# Patient Record
Sex: MALE | Race: BLACK OR AFRICAN AMERICAN | URBAN - METROPOLITAN AREA
[De-identification: names, ages, dates, MRNs, and addresses within clinical notes are randomized per-mention and may not be internally consistent; named-entity substitution may affect disease eponyms.]

---

## 2019-07-30 LAB
ABS NEUT (OLG): 3.4 X10(3)/MCL (ref 1.5–6.9)
APPEARANCE, UA: CLEAR
BACTERIA SPEC CULT: NORMAL /HPF
BILIRUB UR QL STRIP: NEGATIVE
BUN SERPL-MCNC: 16 MG/DL (ref 10–20)
CALCIUM SERPL-MCNC: 8.6 MG/DL (ref 8–10.5)
CHLORIDE SERPL-SCNC: 106 MMOL/L (ref 100–108)
CO2 SERPL-SCNC: 28 MMOL/L (ref 21–35)
COLOR UR: ABNORMAL
CREAT SERPL-MCNC: 1.65 MG/DL (ref 0.7–1.3)
CREAT/UREA NIT SERPL: 10
ERYTHROCYTE [DISTWIDTH] IN BLOOD BY AUTOMATED COUNT: 12.9 % (ref 11.5–17)
GLUCOSE (UA): NEGATIVE
GLUCOSE SERPL-MCNC: 97 MG/DL (ref 75–116)
HCT VFR BLD AUTO: 41.2 % (ref 42–52)
HGB BLD-MCNC: 13.7 GM/DL (ref 14–18)
HGB UR QL STRIP: NEGATIVE
KETONES UR QL STRIP: NEGATIVE
LEUKOCYTE ESTERASE UR QL STRIP: NEGATIVE
MCH RBC QN AUTO: 29 PG (ref 27–34)
MCHC RBC AUTO-ENTMCNC: 33 GM/DL (ref 31–36)
MCV RBC AUTO: 87 FL (ref 80–99)
NITRITE UR QL STRIP: NEGATIVE
PH UR STRIP: 6 [PH]
PLATELET # BLD AUTO: 169 X10(3)/MCL (ref 140–400)
PMV BLD AUTO: 10.3 FL (ref 6.8–10)
POTASSIUM SERPL-SCNC: 3.7 MMOL/L (ref 3.6–5.2)
PROT UR QL STRIP: 30 MG/DL
RBC # BLD AUTO: 4.76 X10(6)/MCL (ref 4.7–6.1)
RBC #/AREA URNS HPF: NORMAL /HPF
SODIUM SERPL-SCNC: 141 MMOL/L (ref 135–145)
SP GR UR STRIP: 1.02
SQUAMOUS EPITHELIAL, UA: NORMAL /LPF
UROBILINOGEN UR STRIP-ACNC: 0.2 EU/DL
WBC # SPEC AUTO: 6.7 X10(3)/MCL (ref 4.5–11.5)
WBC #/AREA URNS HPF: NORMAL /HPF

## 2019-07-31 ENCOUNTER — HISTORICAL (OUTPATIENT)
Dept: ANESTHESIOLOGY | Facility: HOSPITAL | Age: 64
End: 2019-07-31

## 2022-04-30 NOTE — OP NOTE
PREOPERATIVE DIAGNOSIS:  Large osteophyte, medial femoral condyle of the right knee.    POSTOPERATIVE DIAGNOSIS:  Large osteophyte, medial femoral condyle of the right knee.    PROCEDURE:  Arthroscopic removal of medial femoral condylar osteophyte.  He also had grade 2-3 changes of the trochlea.    INDICATIONS:  The patient is a 63-year-old male with a painful large bony prominence of the medial femoral condyle.    DESCRIPTION OF PROCEDURE:  He was brought to the OR, given IV antibiotics and general anesthesia.  Prepped and draped.  He had a large palpable bone spur medially, rather large in size.  He was scoped.  He had no effusion.  Articular weightbearing surface was in good shape.  No meniscal pathology.  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  Osteophyte was visualized intra-articularly, so I took a bur and arthroscopically burred it down where it was no longer palpable.  The meniscus was intact.  The wound was irrigated.  Portals closed with nylon.  Sterile dressing applied.  No complications.        BARRY/PATRICA   DD: 07/31/2019 1258   DT: 07/31/2019 1309  Job # [id number]

## 2025-03-05 ENCOUNTER — LAB VISIT (OUTPATIENT)
Dept: LAB | Facility: HOSPITAL | Age: 70
End: 2025-03-05
Attending: INTERNAL MEDICINE
Payer: MEDICARE

## 2025-03-05 DIAGNOSIS — E11.9 DIABETES MELLITUS WITHOUT COMPLICATION: ICD-10-CM

## 2025-03-05 DIAGNOSIS — E78.00 PURE HYPERCHOLESTEROLEMIA: ICD-10-CM

## 2025-03-05 DIAGNOSIS — E55.9 VITAMIN D DEFICIENCY: Primary | ICD-10-CM

## 2025-03-05 DIAGNOSIS — E03.9 HYPOTHYROIDISM, UNSPECIFIED TYPE: ICD-10-CM

## 2025-03-05 DIAGNOSIS — I10 HYPERTENSION, UNSPECIFIED TYPE: ICD-10-CM

## 2025-03-05 LAB
25(OH)D3+25(OH)D2 SERPL-MCNC: 82 NG/ML (ref 30–80)
ALBUMIN SERPL-MCNC: 3.9 G/DL (ref 3.4–4.8)
ALBUMIN/GLOB SERPL: 1.1 RATIO (ref 1.1–2)
ALP SERPL-CCNC: 49 UNIT/L (ref 40–150)
ALT SERPL-CCNC: 25 UNIT/L (ref 0–55)
ANION GAP SERPL CALC-SCNC: 7 MEQ/L
AST SERPL-CCNC: 19 UNIT/L (ref 5–34)
BASOPHILS # BLD AUTO: 0.05 X10(3)/MCL
BASOPHILS NFR BLD AUTO: 0.7 %
BILIRUB SERPL-MCNC: 0.6 MG/DL
BUN SERPL-MCNC: 18 MG/DL (ref 8.4–25.7)
CALCIUM SERPL-MCNC: 9.1 MG/DL (ref 8.8–10)
CHLORIDE SERPL-SCNC: 109 MMOL/L (ref 98–107)
CHOLEST SERPL-MCNC: 129 MG/DL
CHOLEST/HDLC SERPL: 2 {RATIO} (ref 0–5)
CO2 SERPL-SCNC: 27 MMOL/L (ref 23–31)
CREAT SERPL-MCNC: 1.28 MG/DL (ref 0.72–1.25)
CREAT/UREA NIT SERPL: 14
EOSINOPHIL # BLD AUTO: 0.16 X10(3)/MCL (ref 0–0.9)
EOSINOPHIL NFR BLD AUTO: 2.4 %
ERYTHROCYTE [DISTWIDTH] IN BLOOD BY AUTOMATED COUNT: 12.8 % (ref 11.5–17)
EST. AVERAGE GLUCOSE BLD GHB EST-MCNC: 85.3 MG/DL
GFR SERPLBLD CREATININE-BSD FMLA CKD-EPI: >60 ML/MIN/1.73/M2
GLOBULIN SER-MCNC: 3.6 GM/DL (ref 2.4–3.5)
GLUCOSE SERPL-MCNC: 104 MG/DL (ref 82–115)
HBA1C MFR BLD: 4.6 %
HCT VFR BLD AUTO: 41.1 % (ref 42–52)
HDLC SERPL-MCNC: 57 MG/DL (ref 35–60)
HGB BLD-MCNC: 13.6 G/DL (ref 14–18)
IMM GRANULOCYTES # BLD AUTO: 0.03 X10(3)/MCL (ref 0–0.04)
IMM GRANULOCYTES NFR BLD AUTO: 0.4 %
LDLC SERPL CALC-MCNC: 56 MG/DL (ref 50–140)
LYMPHOCYTES # BLD AUTO: 1.93 X10(3)/MCL (ref 0.6–4.6)
LYMPHOCYTES NFR BLD AUTO: 28.8 %
MCH RBC QN AUTO: 28.9 PG (ref 27–31)
MCHC RBC AUTO-ENTMCNC: 33.1 G/DL (ref 33–36)
MCV RBC AUTO: 87.4 FL (ref 80–94)
MONOCYTES # BLD AUTO: 0.48 X10(3)/MCL (ref 0.1–1.3)
MONOCYTES NFR BLD AUTO: 7.2 %
NEUTROPHILS # BLD AUTO: 4.04 X10(3)/MCL (ref 2.1–9.2)
NEUTROPHILS NFR BLD AUTO: 60.5 %
NRBC BLD AUTO-RTO: 0 %
PLATELET # BLD AUTO: 163 X10(3)/MCL (ref 130–400)
PMV BLD AUTO: 10.5 FL (ref 7.4–10.4)
POTASSIUM SERPL-SCNC: 4 MMOL/L (ref 3.5–5.1)
PROT SERPL-MCNC: 7.5 GM/DL (ref 5.8–7.6)
RBC # BLD AUTO: 4.7 X10(6)/MCL (ref 4.7–6.1)
SODIUM SERPL-SCNC: 143 MMOL/L (ref 136–145)
TRIGL SERPL-MCNC: 80 MG/DL (ref 34–140)
TSH SERPL-ACNC: 1.27 UIU/ML (ref 0.35–4.94)
VLDLC SERPL CALC-MCNC: 16 MG/DL
WBC # BLD AUTO: 6.69 X10(3)/MCL (ref 4.5–11.5)

## 2025-03-05 PROCEDURE — 85025 COMPLETE CBC W/AUTO DIFF WBC: CPT

## 2025-03-05 PROCEDURE — 84443 ASSAY THYROID STIM HORMONE: CPT

## 2025-03-05 PROCEDURE — 80061 LIPID PANEL: CPT

## 2025-03-05 PROCEDURE — 82306 VITAMIN D 25 HYDROXY: CPT

## 2025-03-05 PROCEDURE — 83036 HEMOGLOBIN GLYCOSYLATED A1C: CPT

## 2025-03-05 PROCEDURE — 36415 COLL VENOUS BLD VENIPUNCTURE: CPT

## 2025-03-05 PROCEDURE — 80053 COMPREHEN METABOLIC PANEL: CPT

## 2025-07-10 ENCOUNTER — HOSPITAL ENCOUNTER (EMERGENCY)
Facility: HOSPITAL | Age: 70
Discharge: HOME | End: 2025-07-10
Attending: EMERGENCY MEDICINE
Payer: MEDICARE

## 2025-07-10 VITALS
WEIGHT: 202 LBS | HEIGHT: 67 IN | DIASTOLIC BLOOD PRESSURE: 85 MMHG | OXYGEN SATURATION: 96 % | SYSTOLIC BLOOD PRESSURE: 136 MMHG | BODY MASS INDEX: 31.71 KG/M2 | RESPIRATION RATE: 20 BRPM | TEMPERATURE: 99 F | HEART RATE: 101 BPM

## 2025-07-10 DIAGNOSIS — R07.9 CHEST PAIN: ICD-10-CM

## 2025-07-10 DIAGNOSIS — I50.9 CONGESTIVE HEART FAILURE, UNSPECIFIED HF CHRONICITY, UNSPECIFIED HEART FAILURE TYPE: Primary | ICD-10-CM

## 2025-07-10 LAB
ALBUMIN SERPL-MCNC: 3.9 G/DL (ref 3.4–4.8)
ALBUMIN/GLOB SERPL: 1 RATIO (ref 1.1–2)
ALP SERPL-CCNC: 65 UNIT/L (ref 40–150)
ALT SERPL-CCNC: 31 UNIT/L (ref 0–55)
ANION GAP SERPL CALC-SCNC: 10 MEQ/L
AST SERPL-CCNC: 27 UNIT/L (ref 11–45)
BASOPHILS # BLD AUTO: 0.1 X10(3)/MCL
BASOPHILS NFR BLD AUTO: 1.2 %
BILIRUB SERPL-MCNC: 0.9 MG/DL
BNP BLD-MCNC: 301.3 PG/ML
BUN SERPL-MCNC: 9 MG/DL (ref 8.4–25.7)
CALCIUM SERPL-MCNC: 9.4 MG/DL (ref 8.8–10)
CHLORIDE SERPL-SCNC: 109 MMOL/L (ref 98–107)
CO2 SERPL-SCNC: 23 MMOL/L (ref 23–31)
CREAT SERPL-MCNC: 1.12 MG/DL (ref 0.72–1.25)
CREAT/UREA NIT SERPL: 8
D DIMER PPP IA.FEU-MCNC: <0.27 UG/ML FEU (ref 0–0.5)
EOSINOPHIL # BLD AUTO: 0.13 X10(3)/MCL (ref 0–0.9)
EOSINOPHIL NFR BLD AUTO: 1.6 %
ERYTHROCYTE [DISTWIDTH] IN BLOOD BY AUTOMATED COUNT: 13.6 % (ref 11.5–17)
GFR SERPLBLD CREATININE-BSD FMLA CKD-EPI: >60 ML/MIN/1.73/M2
GLOBULIN SER-MCNC: 4.1 GM/DL (ref 2.4–3.5)
GLUCOSE SERPL-MCNC: 72 MG/DL (ref 82–115)
HCT VFR BLD AUTO: 42.2 % (ref 42–52)
HGB BLD-MCNC: 13.7 G/DL (ref 14–18)
IMM GRANULOCYTES # BLD AUTO: 0.04 X10(3)/MCL (ref 0–0.04)
IMM GRANULOCYTES NFR BLD AUTO: 0.5 %
LYMPHOCYTES # BLD AUTO: 1.98 X10(3)/MCL (ref 0.6–4.6)
LYMPHOCYTES NFR BLD AUTO: 24.3 %
MAGNESIUM SERPL-MCNC: 2.2 MG/DL (ref 1.6–2.6)
MCH RBC QN AUTO: 28.5 PG (ref 27–31)
MCHC RBC AUTO-ENTMCNC: 32.5 G/DL (ref 33–36)
MCV RBC AUTO: 87.7 FL (ref 80–94)
MONOCYTES # BLD AUTO: 0.49 X10(3)/MCL (ref 0.1–1.3)
MONOCYTES NFR BLD AUTO: 6 %
NEUTROPHILS # BLD AUTO: 5.4 X10(3)/MCL (ref 2.1–9.2)
NEUTROPHILS NFR BLD AUTO: 66.4 %
NRBC BLD AUTO-RTO: 0 %
PLATELET # BLD AUTO: 190 X10(3)/MCL (ref 130–400)
PMV BLD AUTO: 10.2 FL (ref 7.4–10.4)
POTASSIUM SERPL-SCNC: 3.6 MMOL/L (ref 3.5–5.1)
PROT SERPL-MCNC: 8 GM/DL (ref 5.8–7.6)
RBC # BLD AUTO: 4.81 X10(6)/MCL (ref 4.7–6.1)
SODIUM SERPL-SCNC: 142 MMOL/L (ref 136–145)
TROPONIN I SERPL-MCNC: 0.02 NG/ML (ref 0–0.04)
WBC # BLD AUTO: 8.14 X10(3)/MCL (ref 4.5–11.5)

## 2025-07-10 PROCEDURE — 93010 ELECTROCARDIOGRAM REPORT: CPT | Mod: ,,, | Performed by: INTERNAL MEDICINE

## 2025-07-10 PROCEDURE — 96374 THER/PROPH/DIAG INJ IV PUSH: CPT

## 2025-07-10 PROCEDURE — 93005 ELECTROCARDIOGRAM TRACING: CPT

## 2025-07-10 PROCEDURE — 84484 ASSAY OF TROPONIN QUANT: CPT | Performed by: EMERGENCY MEDICINE

## 2025-07-10 PROCEDURE — 99285 EMERGENCY DEPT VISIT HI MDM: CPT | Mod: 25

## 2025-07-10 PROCEDURE — 63600175 PHARM REV CODE 636 W HCPCS: Performed by: NURSE PRACTITIONER

## 2025-07-10 PROCEDURE — 85379 FIBRIN DEGRADATION QUANT: CPT | Performed by: NURSE PRACTITIONER

## 2025-07-10 PROCEDURE — 99900031 HC PATIENT EDUCATION (STAT)

## 2025-07-10 PROCEDURE — 83735 ASSAY OF MAGNESIUM: CPT | Performed by: EMERGENCY MEDICINE

## 2025-07-10 PROCEDURE — 94640 AIRWAY INHALATION TREATMENT: CPT

## 2025-07-10 PROCEDURE — 85025 COMPLETE CBC W/AUTO DIFF WBC: CPT | Performed by: EMERGENCY MEDICINE

## 2025-07-10 PROCEDURE — 83880 ASSAY OF NATRIURETIC PEPTIDE: CPT | Performed by: EMERGENCY MEDICINE

## 2025-07-10 PROCEDURE — 25000242 PHARM REV CODE 250 ALT 637 W/ HCPCS: Performed by: NURSE PRACTITIONER

## 2025-07-10 PROCEDURE — 94761 N-INVAS EAR/PLS OXIMETRY MLT: CPT

## 2025-07-10 PROCEDURE — 80053 COMPREHEN METABOLIC PANEL: CPT | Performed by: EMERGENCY MEDICINE

## 2025-07-10 RX ORDER — FUROSEMIDE 10 MG/ML
40 INJECTION INTRAMUSCULAR; INTRAVENOUS
Status: COMPLETED | OUTPATIENT
Start: 2025-07-10 | End: 2025-07-10

## 2025-07-10 RX ORDER — FUROSEMIDE 20 MG/1
20 TABLET ORAL DAILY
Qty: 5 TABLET | Refills: 0 | Status: SHIPPED | OUTPATIENT
Start: 2025-07-10 | End: 2025-07-15

## 2025-07-10 RX ORDER — IPRATROPIUM BROMIDE AND ALBUTEROL SULFATE 2.5; .5 MG/3ML; MG/3ML
3 SOLUTION RESPIRATORY (INHALATION)
Status: COMPLETED | OUTPATIENT
Start: 2025-07-10 | End: 2025-07-10

## 2025-07-10 RX ADMIN — FUROSEMIDE 40 MG: 10 INJECTION, SOLUTION INTRAVENOUS at 02:07

## 2025-07-10 RX ADMIN — IPRATROPIUM BROMIDE AND ALBUTEROL SULFATE 3 ML: .5; 3 SOLUTION RESPIRATORY (INHALATION) at 12:07

## 2025-07-10 NOTE — ED PROVIDER NOTES
Encounter Date: 7/10/2025       History     Chief Complaint   Patient presents with    Shortness of Breath     C/o SOB, CP, ringing in ears, right sided facial pain x 10 days. SOB is getting worse with exertion now. Pt was seen at the outpatient clinic and started on meds 10 days ago with no improvement     See MDM    The history is provided by the patient. No  was used.     Review of patient's allergies indicates:  No Known Allergies  Past Medical History:   Diagnosis Date    BPH (benign prostatic hyperplasia)     GERD (gastroesophageal reflux disease)     Hypertension     Mixed hyperlipidemia      History reviewed. No pertinent surgical history.  No family history on file.  Social History[1]  Review of Systems   Constitutional:  Negative for fever.   Respiratory:  Positive for shortness of breath. Negative for cough.    Cardiovascular:  Positive for chest pain.   Gastrointestinal:  Negative for abdominal pain.   Genitourinary:  Negative for difficulty urinating and dysuria.   Musculoskeletal:  Negative for gait problem.   Skin:  Negative for color change.   Neurological:  Negative for dizziness, speech difficulty and headaches.   Psychiatric/Behavioral:  Negative for hallucinations and suicidal ideas.    All other systems reviewed and are negative.      Physical Exam     Initial Vitals [07/10/25 1159]   BP Pulse Resp Temp SpO2   136/85 100 18 98.5 °F (36.9 °C) 96 %      MAP       --         Physical Exam    Nursing note and vitals reviewed.  Constitutional: He appears well-developed and well-nourished.   HENT:   Head: Normocephalic.   Tenderness to right jaw. Right ear TM normal. Left ear chronic TM rupture.    Eyes: EOM are normal.   Neck: Neck supple.   Normal range of motion.  Cardiovascular:  Normal rate, regular rhythm, normal heart sounds and intact distal pulses.           Mildly decreased breath sounds    Pulmonary/Chest: Breath sounds normal.   Abdominal: Abdomen is soft. Bowel sounds  are normal.   Musculoskeletal:         General: Normal range of motion.      Cervical back: Normal range of motion and neck supple.     Neurological: He is alert and oriented to person, place, and time. He has normal strength.   Skin: Skin is warm and dry. Capillary refill takes less than 2 seconds.   Psychiatric: He has a normal mood and affect. His behavior is normal. Judgment and thought content normal.         ED Course   Procedures  Labs Reviewed   B-TYPE NATRIURETIC PEPTIDE - Abnormal       Result Value    Natriuretic Peptide 301.3 (*)    COMPREHENSIVE METABOLIC PANEL - Abnormal    Sodium 142      Potassium 3.6      Chloride 109 (*)     CO2 23      Glucose 72 (*)     Blood Urea Nitrogen 9.0      Creatinine 1.12      Calcium 9.4      Protein Total 8.0 (*)     Albumin 3.9      Globulin 4.1 (*)     Albumin/Globulin Ratio 1.0 (*)     Bilirubin Total 0.9      ALP 65      ALT 31      AST 27      eGFR >60      Anion Gap 10.0      BUN/Creatinine Ratio 8     CBC WITH DIFFERENTIAL - Abnormal    WBC 8.14      RBC 4.81      Hgb 13.7 (*)     Hct 42.2      MCV 87.7      MCH 28.5      MCHC 32.5 (*)     RDW 13.6      Platelet 190      MPV 10.2      Neut % 66.4      Lymph % 24.3      Mono % 6.0      Eos % 1.6      Basophil % 1.2      Imm Grans % 0.5      Neut # 5.40      Lymph # 1.98      Mono # 0.49      Eos # 0.13      Baso # 0.10      Imm Gran # 0.04      NRBC% 0.0     MAGNESIUM - Normal    Magnesium Level 2.20     TROPONIN I - Normal    Troponin-I 0.023     D DIMER, QUANTITATIVE - Normal    D-Dimer <0.27     CBC W/ AUTO DIFFERENTIAL    Narrative:     The following orders were created for panel order CBC auto differential.  Procedure                               Abnormality         Status                     ---------                               -----------         ------                     CBC with Differential[[id number]]       Abnormal            Final result                 Please view results for these tests on the  individual orders.     EKG Readings: (Independently Interpreted)   Rhythm: Normal Sinus Rhythm. Heart Rate: 96. Ectopy: PVCs. Conduction: Normal. ST Segments: Normal ST Segments. T Waves: Normal. Axis: Normal. Other Findings: Prolonged QT Interval. Clinical Impression: Normal Sinus Rhythm     ECG Results              EKG 12-lead (In process)        Collection Time Result Time QRS Duration OHS QTC Calculation    07/10/25 12:00:03 07/10/25 13:04:47 88 502                     In process by Interface, Lab In [facility] (07/10/25 13:04:49)                   Narrative:    Test Reason : R07.9,    Vent. Rate :  96 BPM     Atrial Rate :  96 BPM     P-R Int : 194 ms          QRS Dur :  88 ms      QT Int : 398 ms       P-R-T Axes :  54  74  53 degrees    QTcB Int : 502 ms    Sinus rhythm with Premature atrial complexes with Aberrant conduction  Possible Left atrial enlargement  Nonspecific T wave abnormality  Prolonged QT  Abnormal ECG  No previous ECGs available    Referred By: AAAREFERRAL SELF           Confirmed By:                                   Imaging Results              X-Ray Chest AP Portable (Final result)  Result time 07/10/25 13:42:24      Final result by Felipe Kincaid MD (07/10/25 13:42:24)                   Impression:      1. Mild cardiomegaly  2. Increased vascular markings lower lungs  3. Thoracic spondylosis and mild scoliosis      Electronically signed by: Felipe Kincaid  Date:    07/10/2025  Time:    13:42               Narrative:    EXAMINATION:  XR CHEST AP PORTABLE    CLINICAL HISTORY:  chest pain;, .    COMPARISON:  07/13/2020    FINDINGS:  An AP view or more reveals heart to be mildly enlarged.  The trachea is midline.  No definite infiltrate or effusion is seen.  Mild increased vascular markings noted to lower lungs.  Degenerative changes and mild curvature are noted to the thoracic spine.                                       Medications   albuterol-ipratropium 2.5 mg-0.5 mg/3 mL nebulizer  "solution 3 mL (3 mLs Nebulization Given 7/10/25 1239)   furosemide injection 40 mg (40 mg Intravenous Given 7/10/25 1409)     Medical Decision Making  Historian:  Patient.  Patient is a Black or  69 y.o. male that presents with chest pain and shortness of breath that has been present 10 days. Associated symptoms were "popping" left ear and right facial pain. Surrounding information is nothing. Exacerbated by exertion. Relieved by nothing. Patient treatment prior to arrival went to urgent care and got an inhaler and medication. Risk factors include none. Other history pertaining to this complaint none.   Assessment:  See physical exam.  DD: ACS, CHF, Pneumonia, Bronchitis, Reactive Airway disease, PE, MI   ED Course: History was obtained.  Physical was performed.  Patient's BNP was elevated along with mild cardiomegaly. Lasix 40 mg given IVP. Duoneb was given and did not help symptoms. Will reassess SOB after diuresis.  D=dimer was checked and it was normal. Patient has right side facial pain. He was tender to with right mandible. No signs of abscess. His ears show no infection. Medical or surgical consults:  none. Social determinants that affect healthcare:  Health care illiteracy. Spent additional time on patient education pertaining to his current helath status, diagnosis, and prescribed medications.   Patient's breathing improved after lasix and diuresis.     Amount and/or Complexity of Data Reviewed  Labs: ordered.     Details: Labs unremarkable    Radiology: ordered.     Details: Mild cardiomegaly     Risk  Prescription drug management.  Risk Details: Lasix               ED Course as of 07/10/25 1442   Thu Jul 10, 2025   1206 Mildly decreased breath sounds. Duoneb ordered.  [CL]      ED Course User Index  [CL] Noe Nicholas, GEORGES                           Clinical Impression:  Final diagnoses:  [R07.9] Chest pain  [I50.9] Congestive heart failure, unspecified HF chronicity, unspecified heart " failure type (Primary)          ED Disposition Condition    Discharge Stable          ED Prescriptions       Medication Sig Dispense Start Date End Date Auth. Provider    furosemide (LASIX) 20 MG tablet Take 1 tablet (20 mg total) by mouth once daily. for 5 days 5 tablet 7/10/2025 7/15/2025 Noe Nicholas FNP          Follow-up Information       Follow up With Specialties Details Why Contact Info    Erich Borja MD Internal Medicine Call in 3 days ed follow up [address] [facility] [address]  [place] [address]  [phone]                     [1]   Social History  Tobacco Use    Smoking status: Never    Smokeless tobacco: Never   Vaping Use    Vaping status: Never Used   Substance Use Topics    Alcohol use: Not Currently    Drug use: Not Currently        Noe Nicholas FNP  07/10/25 3734

## 2025-07-11 LAB
OHS QRS DURATION: 88 MS
OHS QTC CALCULATION: 502 MS

## 2025-07-25 ENCOUNTER — HOSPITAL ENCOUNTER (INPATIENT)
Facility: HOSPITAL | Age: 70
LOS: 6 days | Discharge: HOME HEALTH | DRG: 314 | End: 2025-07-31
Attending: EMERGENCY MEDICINE | Admitting: INTERNAL MEDICINE
Payer: MEDICARE

## 2025-07-25 DIAGNOSIS — R07.89 ATYPICAL CHEST PAIN: Primary | ICD-10-CM

## 2025-07-25 DIAGNOSIS — R06.02 SOB (SHORTNESS OF BREATH): ICD-10-CM

## 2025-07-25 DIAGNOSIS — I50.9 ACUTE ON CHRONIC CONGESTIVE HEART FAILURE, UNSPECIFIED HEART FAILURE TYPE: ICD-10-CM

## 2025-07-25 DIAGNOSIS — I50.21 ACUTE SYSTOLIC HEART FAILURE: ICD-10-CM

## 2025-07-25 DIAGNOSIS — J18.9 PNEUMONIA: ICD-10-CM

## 2025-07-25 LAB
ALBUMIN SERPL-MCNC: 3.6 G/DL (ref 3.4–4.8)
ALBUMIN/GLOB SERPL: 0.9 RATIO (ref 1.1–2)
ALLENS TEST: ABNORMAL
ALP SERPL-CCNC: 55 UNIT/L (ref 40–150)
ALT SERPL-CCNC: 28 UNIT/L (ref 0–55)
ANION GAP SERPL CALC-SCNC: 8 MEQ/L
AST SERPL-CCNC: 23 UNIT/L (ref 11–45)
BASOPHILS # BLD AUTO: 0.02 X10(3)/MCL
BASOPHILS NFR BLD AUTO: 0.3 %
BILIRUB SERPL-MCNC: 0.9 MG/DL
BUN SERPL-MCNC: 11 MG/DL (ref 8.4–25.7)
CALCIUM SERPL-MCNC: 9.1 MG/DL (ref 8.8–10)
CHLORIDE SERPL-SCNC: 109 MMOL/L (ref 98–107)
CO2 SERPL-SCNC: 26 MMOL/L (ref 23–31)
CREAT SERPL-MCNC: 1.3 MG/DL (ref 0.72–1.25)
CREAT/UREA NIT SERPL: 8
DELSYS: ABNORMAL
EOSINOPHIL # BLD AUTO: 0.18 X10(3)/MCL (ref 0–0.9)
EOSINOPHIL NFR BLD AUTO: 2.4 %
ERYTHROCYTE [DISTWIDTH] IN BLOOD BY AUTOMATED COUNT: 13.5 % (ref 11.5–17)
GFR SERPLBLD CREATININE-BSD FMLA CKD-EPI: 60 ML/MIN/1.73/M2
GLOBULIN SER-MCNC: 3.8 GM/DL (ref 2.4–3.5)
GLUCOSE SERPL-MCNC: 115 MG/DL (ref 82–115)
HCO3 UR-SCNC: 21.5 MMOL/L (ref 24–28)
HCT VFR BLD AUTO: 40 % (ref 42–52)
HGB BLD-MCNC: 13.1 G/DL (ref 14–18)
IMM GRANULOCYTES # BLD AUTO: 0.03 X10(3)/MCL (ref 0–0.04)
IMM GRANULOCYTES NFR BLD AUTO: 0.4 %
LACTATE SERPL-SCNC: 1.6 MMOL/L (ref 0.5–2.2)
LIPASE SERPL-CCNC: 14 U/L
LYMPHOCYTES # BLD AUTO: 1.67 X10(3)/MCL (ref 0.6–4.6)
LYMPHOCYTES NFR BLD AUTO: 22.5 %
MCH RBC QN AUTO: 28.4 PG (ref 27–31)
MCHC RBC AUTO-ENTMCNC: 32.8 G/DL (ref 33–36)
MCV RBC AUTO: 86.8 FL (ref 80–94)
MONOCYTES # BLD AUTO: 0.45 X10(3)/MCL (ref 0.1–1.3)
MONOCYTES NFR BLD AUTO: 6.1 %
NEUTROPHILS # BLD AUTO: 5.06 X10(3)/MCL (ref 2.1–9.2)
NEUTROPHILS NFR BLD AUTO: 68.3 %
NT-PROBNP SERPL-MCNC: 901 PG/ML
OHS QRS DURATION: 88 MS
OHS QTC CALCULATION: 500 MS
PCO2 BLDA: 28.1 MMHG (ref 35–45)
PH SMN: 7.49 [PH] (ref 7.35–7.45)
PLATELET # BLD AUTO: 183 X10(3)/MCL (ref 130–400)
PMV BLD AUTO: 9.7 FL (ref 7.4–10.4)
PO2 BLDA: 64 MMHG (ref 80–100)
POC BE: -2 MMOL/L (ref -2–2)
POC SATURATED O2: 94 % (ref 95–100)
POC TCO2: 22 MMOL/L (ref 23–27)
POTASSIUM SERPL-SCNC: 3.5 MMOL/L (ref 3.5–5.1)
PROT SERPL-MCNC: 7.4 GM/DL (ref 5.8–7.6)
RBC # BLD AUTO: 4.61 X10(6)/MCL (ref 4.7–6.1)
SAMPLE: ABNORMAL
SITE: ABNORMAL
SODIUM SERPL-SCNC: 143 MMOL/L (ref 136–145)
TROPONIN I SERPL HS-MCNC: 22 NG/L
TROPONIN I SERPL HS-MCNC: 23 NG/L
WBC # BLD AUTO: 7.41 X10(3)/MCL (ref 4.5–11.5)

## 2025-07-25 PROCEDURE — 83605 ASSAY OF LACTIC ACID: CPT | Performed by: EMERGENCY MEDICINE

## 2025-07-25 PROCEDURE — 94640 AIRWAY INHALATION TREATMENT: CPT

## 2025-07-25 PROCEDURE — 80053 COMPREHEN METABOLIC PANEL: CPT | Performed by: EMERGENCY MEDICINE

## 2025-07-25 PROCEDURE — 99900035 HC TECH TIME PER 15 MIN (STAT)

## 2025-07-25 PROCEDURE — 93005 ELECTROCARDIOGRAM TRACING: CPT

## 2025-07-25 PROCEDURE — 84484 ASSAY OF TROPONIN QUANT: CPT | Performed by: EMERGENCY MEDICINE

## 2025-07-25 PROCEDURE — 96374 THER/PROPH/DIAG INJ IV PUSH: CPT

## 2025-07-25 PROCEDURE — 11000001 HC ACUTE MED/SURG PRIVATE ROOM

## 2025-07-25 PROCEDURE — 36600 WITHDRAWAL OF ARTERIAL BLOOD: CPT

## 2025-07-25 PROCEDURE — 25000242 PHARM REV CODE 250 ALT 637 W/ HCPCS: Performed by: INTERNAL MEDICINE

## 2025-07-25 PROCEDURE — 25000003 PHARM REV CODE 250: Performed by: INTERNAL MEDICINE

## 2025-07-25 PROCEDURE — 94761 N-INVAS EAR/PLS OXIMETRY MLT: CPT | Mod: XB

## 2025-07-25 PROCEDURE — 93010 ELECTROCARDIOGRAM REPORT: CPT | Mod: ,,, | Performed by: INTERNAL MEDICINE

## 2025-07-25 PROCEDURE — 99900031 HC PATIENT EDUCATION (STAT)

## 2025-07-25 PROCEDURE — 25500020 PHARM REV CODE 255: Performed by: EMERGENCY MEDICINE

## 2025-07-25 PROCEDURE — 63600175 PHARM REV CODE 636 W HCPCS: Performed by: INTERNAL MEDICINE

## 2025-07-25 PROCEDURE — 82803 BLOOD GASES ANY COMBINATION: CPT

## 2025-07-25 PROCEDURE — 25000003 PHARM REV CODE 250: Performed by: EMERGENCY MEDICINE

## 2025-07-25 PROCEDURE — 87040 BLOOD CULTURE FOR BACTERIA: CPT | Performed by: EMERGENCY MEDICINE

## 2025-07-25 PROCEDURE — 63600175 PHARM REV CODE 636 W HCPCS: Performed by: EMERGENCY MEDICINE

## 2025-07-25 PROCEDURE — 83880 ASSAY OF NATRIURETIC PEPTIDE: CPT | Performed by: EMERGENCY MEDICINE

## 2025-07-25 PROCEDURE — 83690 ASSAY OF LIPASE: CPT | Performed by: EMERGENCY MEDICINE

## 2025-07-25 PROCEDURE — 99285 EMERGENCY DEPT VISIT HI MDM: CPT | Mod: 25

## 2025-07-25 PROCEDURE — 85025 COMPLETE CBC W/AUTO DIFF WBC: CPT | Performed by: EMERGENCY MEDICINE

## 2025-07-25 PROCEDURE — 25000242 PHARM REV CODE 250 ALT 637 W/ HCPCS: Performed by: EMERGENCY MEDICINE

## 2025-07-25 RX ORDER — ATORVASTATIN CALCIUM 20 MG/1
20 TABLET, FILM COATED ORAL DAILY
COMMUNITY
Start: 2025-07-21

## 2025-07-25 RX ORDER — SODIUM CHLORIDE 0.9 % (FLUSH) 0.9 %
10 SYRINGE (ML) INJECTION
Status: DISCONTINUED | OUTPATIENT
Start: 2025-07-25 | End: 2025-07-31 | Stop reason: HOSPADM

## 2025-07-25 RX ORDER — FUROSEMIDE 10 MG/ML
20 INJECTION INTRAMUSCULAR; INTRAVENOUS
Status: COMPLETED | OUTPATIENT
Start: 2025-07-25 | End: 2025-07-25

## 2025-07-25 RX ORDER — BUDESONIDE 0.5 MG/2ML
0.5 INHALANT ORAL EVERY 12 HOURS
Status: DISCONTINUED | OUTPATIENT
Start: 2025-07-25 | End: 2025-07-30

## 2025-07-25 RX ORDER — TAMSULOSIN HYDROCHLORIDE 0.4 MG/1
0.4 CAPSULE ORAL NIGHTLY
Status: DISCONTINUED | OUTPATIENT
Start: 2025-07-25 | End: 2025-07-31 | Stop reason: HOSPADM

## 2025-07-25 RX ORDER — MUPIROCIN 20 MG/G
OINTMENT TOPICAL 2 TIMES DAILY
Status: DISPENSED | OUTPATIENT
Start: 2025-07-25 | End: 2025-07-30

## 2025-07-25 RX ORDER — IPRATROPIUM BROMIDE AND ALBUTEROL SULFATE 2.5; .5 MG/3ML; MG/3ML
3 SOLUTION RESPIRATORY (INHALATION)
Status: COMPLETED | OUTPATIENT
Start: 2025-07-25 | End: 2025-07-25

## 2025-07-25 RX ORDER — POTASSIUM CHLORIDE 20 MEQ/1
20 TABLET, EXTENDED RELEASE ORAL ONCE
Status: DISCONTINUED | OUTPATIENT
Start: 2025-07-25 | End: 2025-07-25

## 2025-07-25 RX ORDER — PANTOPRAZOLE SODIUM 40 MG/1
40 TABLET, DELAYED RELEASE ORAL DAILY
Status: DISCONTINUED | OUTPATIENT
Start: 2025-07-26 | End: 2025-07-31 | Stop reason: HOSPADM

## 2025-07-25 RX ORDER — IOPAMIDOL 755 MG/ML
100 INJECTION, SOLUTION INTRAVASCULAR
Status: COMPLETED | OUTPATIENT
Start: 2025-07-25 | End: 2025-07-25

## 2025-07-25 RX ORDER — ATORVASTATIN CALCIUM 20 MG/1
20 TABLET, FILM COATED ORAL DAILY
Status: DISCONTINUED | OUTPATIENT
Start: 2025-07-26 | End: 2025-07-31 | Stop reason: HOSPADM

## 2025-07-25 RX ORDER — IPRATROPIUM BROMIDE AND ALBUTEROL SULFATE 2.5; .5 MG/3ML; MG/3ML
3 SOLUTION RESPIRATORY (INHALATION) EVERY 6 HOURS
Status: DISCONTINUED | OUTPATIENT
Start: 2025-07-25 | End: 2025-07-26

## 2025-07-25 RX ORDER — ENOXAPARIN SODIUM 100 MG/ML
40 INJECTION SUBCUTANEOUS EVERY 24 HOURS
Status: DISCONTINUED | OUTPATIENT
Start: 2025-07-26 | End: 2025-07-31 | Stop reason: HOSPADM

## 2025-07-25 RX ORDER — POTASSIUM CHLORIDE 20 MEQ/1
20 TABLET, EXTENDED RELEASE ORAL ONCE
Status: COMPLETED | OUTPATIENT
Start: 2025-07-25 | End: 2025-07-25

## 2025-07-25 RX ORDER — FUROSEMIDE 10 MG/ML
20 INJECTION INTRAMUSCULAR; INTRAVENOUS ONCE
Status: COMPLETED | OUTPATIENT
Start: 2025-07-25 | End: 2025-07-25

## 2025-07-25 RX ORDER — VALSARTAN 160 MG/1
160 TABLET ORAL DAILY
Status: DISCONTINUED | OUTPATIENT
Start: 2025-07-26 | End: 2025-07-28

## 2025-07-25 RX ORDER — DILTIAZEM HYDROCHLORIDE 60 MG/1
2 TABLET, FILM COATED ORAL
COMMUNITY
Start: 2025-07-21

## 2025-07-25 RX ORDER — CARVEDILOL 3.12 MG/1
3.12 TABLET ORAL 2 TIMES DAILY
Status: DISCONTINUED | OUTPATIENT
Start: 2025-07-25 | End: 2025-07-29

## 2025-07-25 RX ORDER — CEFTRIAXONE 1 G/1
1 INJECTION, POWDER, FOR SOLUTION INTRAMUSCULAR; INTRAVENOUS
Status: COMPLETED | OUTPATIENT
Start: 2025-07-25 | End: 2025-07-25

## 2025-07-25 RX ORDER — TALC
6 POWDER (GRAM) TOPICAL NIGHTLY PRN
Status: DISCONTINUED | OUTPATIENT
Start: 2025-07-25 | End: 2025-07-31 | Stop reason: HOSPADM

## 2025-07-25 RX ORDER — FUROSEMIDE 20 MG/1
20 TABLET ORAL 2 TIMES DAILY
Status: DISCONTINUED | OUTPATIENT
Start: 2025-07-26 | End: 2025-07-29

## 2025-07-25 RX ORDER — FLUTICASONE FUROATE AND VILANTEROL 100; 25 UG/1; UG/1
1 POWDER RESPIRATORY (INHALATION) DAILY
Status: DISCONTINUED | OUTPATIENT
Start: 2025-07-26 | End: 2025-07-31 | Stop reason: HOSPADM

## 2025-07-25 RX ORDER — FINASTERIDE 5 MG/1
5 TABLET, FILM COATED ORAL DAILY
Status: DISCONTINUED | OUTPATIENT
Start: 2025-07-26 | End: 2025-07-31 | Stop reason: HOSPADM

## 2025-07-25 RX ORDER — AMLODIPINE AND VALSARTAN 10; 160 MG/1; MG/1
1 TABLET ORAL DAILY
Status: ON HOLD | COMMUNITY
Start: 2025-07-21 | End: 2025-07-30 | Stop reason: HOSPADM

## 2025-07-25 RX ORDER — FINASTERIDE 5 MG/1
5 TABLET, FILM COATED ORAL DAILY
COMMUNITY
Start: 2025-07-25

## 2025-07-25 RX ADMIN — IPRATROPIUM BROMIDE AND ALBUTEROL SULFATE 3 ML: 2.5; .5 SOLUTION RESPIRATORY (INHALATION) at 07:07

## 2025-07-25 RX ADMIN — BUDESONIDE INHALATION 0.5 MG: 0.5 SUSPENSION RESPIRATORY (INHALATION) at 07:07

## 2025-07-25 RX ADMIN — IOPAMIDOL 100 ML: 755 INJECTION, SOLUTION INTRAVENOUS at 12:07

## 2025-07-25 RX ADMIN — FUROSEMIDE 20 MG: 10 INJECTION, SOLUTION INTRAMUSCULAR; INTRAVENOUS at 09:07

## 2025-07-25 RX ADMIN — AZITHROMYCIN DIHYDRATE 500 MG: 500 INJECTION, POWDER, LYOPHILIZED, FOR SOLUTION INTRAVENOUS at 01:07

## 2025-07-25 RX ADMIN — POTASSIUM CHLORIDE 20 MEQ: 1500 TABLET, EXTENDED RELEASE ORAL at 09:07

## 2025-07-25 RX ADMIN — CEFTRIAXONE SODIUM 1 G: 1 INJECTION, POWDER, FOR SOLUTION INTRAMUSCULAR; INTRAVENOUS at 01:07

## 2025-07-25 RX ADMIN — FUROSEMIDE 20 MG: 10 INJECTION, SOLUTION INTRAMUSCULAR; INTRAVENOUS at 10:07

## 2025-07-25 RX ADMIN — CARVEDILOL 3.12 MG: 3.12 TABLET, FILM COATED ORAL at 09:07

## 2025-07-25 RX ADMIN — IPRATROPIUM BROMIDE AND ALBUTEROL SULFATE 3 ML: .5; 3 SOLUTION RESPIRATORY (INHALATION) at 10:07

## 2025-07-25 RX ADMIN — MUPIROCIN 1 G: 20 OINTMENT TOPICAL at 09:07

## 2025-07-25 RX ADMIN — TAMSULOSIN HYDROCHLORIDE 0.4 MG: 0.4 CAPSULE ORAL at 09:07

## 2025-07-25 NOTE — PLAN OF CARE
Problem: Hospitalized Older Adult  Goal: Optimal Cognitive Function  Outcome: Progressing  Goal: Effective Bowel Elimination  Outcome: Progressing  Goal: Optimal Coping  Outcome: Progressing  Goal: Fluid and Electrolyte Balance  Outcome: Progressing  Goal: Optimal Functional Ability  Outcome: Progressing  Goal: Improved Oral Intake  Outcome: Progressing  Goal: Adequate Sleep/Rest  Outcome: Progressing  Goal: Effective Urinary Elimination  Outcome: Progressing     Problem: Adult Inpatient Plan of Care  Goal: Plan of Care Review  Outcome: Progressing  Goal: Patient-Specific Goal (Individualized)  Outcome: Progressing  Goal: Absence of Hospital-Acquired Illness or Injury  Outcome: Progressing  Goal: Optimal Comfort and Wellbeing  Outcome: Progressing  Goal: Readiness for Transition of Care  Outcome: Progressing     Problem: Comorbidity Management  Goal: Blood Pressure in Desired Range  Outcome: Progressing     Problem: Gas Exchange Impaired  Goal: Optimal Gas Exchange  Outcome: Progressing

## 2025-07-25 NOTE — ED PROVIDER NOTES
Encounter Date: 7/25/2025       History     Chief Complaint   Patient presents with    Shortness of Breath    Chest Pain     C/o chest pains and SOB since he was seen here last week     HPI  61-year-old male history of HTN, HLD, GERD, BPH now with complaints of one-week history of increasing shortness of breath, productive cough of yellow sputum, and mid chest wall pain which worsens with coughing episodes.  Patient denies associated fever, chills, headache, neck pain, abdominal pain, increasing leg edema or calf pain.  Patient was seen here 2 weeks ago for similar complaints and diagnosed with mild CHF and discharged home with Lasix after he was given Lasix and DuoNeb here in ED..  Patient had also been seen in an urgent care prior to that and was prescribed an albuterol inhaler for reactive airway dz.   Review of patient's allergies indicates:  No Known Allergies  Past Medical History:   Diagnosis Date    BPH (benign prostatic hyperplasia)     GERD (gastroesophageal reflux disease)     Hypertension     Mixed hyperlipidemia      History reviewed. No pertinent surgical history.  No family history on file.  Social History[1]  Review of Systems   All other systems reviewed and are negative.      Physical Exam     Initial Vitals [07/25/25 0916]   BP Pulse Resp Temp SpO2   (!) 135/97 98 20 97.6 °F (36.4 °C) 97 %      MAP       --         Physical Exam    Nursing note and vitals reviewed.  Constitutional: He appears well-developed and well-nourished.   HENT:   Head: Normocephalic and atraumatic.   Nose: Nose normal. Mouth/Throat: Oropharynx is clear and moist.   Eyes: Conjunctivae and EOM are normal. Pupils are equal, round, and reactive to light.   Neck: Neck supple.   Normal range of motion.  Cardiovascular:  Normal rate, regular rhythm, normal heart sounds and intact distal pulses.           Pulmonary/Chest: He has wheezes.   Abdominal: Abdomen is soft. Bowel sounds are normal. There is no abdominal tenderness.    Musculoskeletal:         General: No tenderness or edema. Normal range of motion.      Cervical back: Normal range of motion and neck supple.     Neurological: He is alert and oriented to person, place, and time. He has normal strength. He displays normal reflexes. No cranial nerve deficit or sensory deficit. GCS score is 15. GCS eye subscore is 4. GCS verbal subscore is 5. GCS motor subscore is 6.   Skin: Skin is warm and dry. No rash noted.   Psychiatric: He has a normal mood and affect. His behavior is normal. Thought content normal.         ED Course   Procedures  Labs Reviewed   COMPREHENSIVE METABOLIC PANEL - Abnormal       Result Value    Sodium 143      Potassium 3.5      Chloride 109 (*)     CO2 26      Glucose 115      Blood Urea Nitrogen 11.0      Creatinine 1.30 (*)     Calcium 9.1      Protein Total 7.4      Albumin 3.6      Globulin 3.8 (*)     Albumin/Globulin Ratio 0.9 (*)     Bilirubin Total 0.9      ALP 55      ALT 28      AST 23      eGFR 60      Anion Gap 8.0      BUN/Creatinine Ratio 8     CBC WITH DIFFERENTIAL - Abnormal    WBC 7.41      RBC 4.61 (*)     Hgb 13.1 (*)     Hct 40.0 (*)     MCV 86.8      MCH 28.4      MCHC 32.8 (*)     RDW 13.5      Platelet 183      MPV 9.7      Neut % 68.3      Lymph % 22.5      Mono % 6.1      Eos % 2.4      Basophil % 0.3      Imm Grans % 0.4      Neut # 5.06      Lymph # 1.67      Mono # 0.45      Eos # 0.18      Baso # 0.02      Imm Gran # 0.03     NT-PRO NATRIURETIC PEPTIDE - Abnormal    NT-proBNP 901 (*)     Narrative:     NOTE:  Access complete set of age - and/or gender-specific reference intervals for this test in the [facility] Collection Manual.   ISTAT PROCEDURE - Abnormal    POC PH 7.493 (*)     POC PCO2 28.1 (*)     POC PO2 64 (*)     POC HCO3 21.5 (*)     POC BE -2      POC SATURATED O2 94      POC TCO2 22 (*)     Sample ARTERIAL      Site RR      Allens Test Pass      DelSys Room Air     TROPONIN I HIGH SENSITIVITY - Normal    Troponin  High Sensitive 23     LIPASE - Normal    Lipase Level 14     TROPONIN I HIGH SENSITIVITY - Normal    Troponin High Sensitive 22     LACTIC ACID, PLASMA - Normal    Lactic Acid Level 1.6     BLOOD CULTURE [facility]   BLOOD CULTURE [facility]   CBC W/ AUTO DIFFERENTIAL    Narrative:     The following orders were created for panel order CBC auto differential.  Procedure                               Abnormality         Status                     ---------                               -----------         ------                     CBC with Differential[[id number]]       Abnormal            Final result                 Please view results for these tests on the individual orders.     EKG Readings: (Independently Interpreted)   Normal sinus rhythm 100, nonspecific T-wave changes, no acute ST elevation or ST depression, normal axis.     ECG Results              EKG 12-lead (Final result)        Collection Time Result Time QRS Duration [facility] QTC Calculation    07/25/25 09:16:53 07/25/25 15:53:41 88 500                     Final result by Interface, Lab In [facility] (07/25/25 15:53:43)                   Narrative:    Test Reason : R07.9,    Vent. Rate : 100 BPM     Atrial Rate : 100 BPM     P-R Int : 198 ms          QRS Dur :  88 ms      QT Int : 388 ms       P-R-T Axes :  51  79  12 degrees    QTcB Int : 500 ms    Normal sinus rhythm  Nonspecific T wave abnormality  Abnormal ECG  When compared with ECG of 10-Jul-2025 12:00,  Aberrant conduction is no longer Present  Confirmed by Sheng Miles ([id number]) on 7/25/2025 3:53:38 PM    Referred By: AAAREFERRAL SELF           Confirmed By: Sheng Miles                                  Imaging Results              CTA Chest Non-Coronary (PE Studies) (Final result)  Result time 07/25/25 13:12:06      Final result by Felipe Kincaid MD (07/25/25 13:12:06)                   Impression:      1. No significant filling defects noted to the pulmonary arteries indicate pulmonary embolus  2.  Cardiomegaly  3. Small bilateral pleural effusion with associated patchy infiltrate and or atelectasis lung bases  4. Hazy ground-glass opacities throughout the lungs bilaterally which may represent small airway disease.  A atypical pneumonia cannot be excluded.  Clinical correlation is indicated  5. Findings and other details as above      Electronically signed by: Felipe Kincaid  Date:    07/25/2025  Time:    13:12               Narrative:    EXAMINATION:  CT ANGIOGRAM CHEST WITH IV CONTRAST:    CLINICAL HISTORY:  High clinical suspicion for pulmonary embolus    TECHNIQUE:  PATIENT RADIATION DOSE: DLP(mGycm) 305    As per PQRS measures, all CT scans at this facility used dose modulation, iterative reconstruction, and/or weight based dose adjustment when appropriate to reduce radiation dose to as low as reasonably achievable.    COMPARISON:  None available    FINDINGS:  Serial axial images were obtained of the chest with the administration of IV contrast. Additional coronal and sagittal mip reconstructions as well as 3-D rotational spin reconstructions were performed. NASCET criteria was utilized. Degenerative changes are noted to the thoracolumbar spine.  There is heterogeneity of the thyroid lobes bilaterally.  Atherosclerosis seen within the aorta and branching vessels.  Heart is enlarged.  Contrast identified within the heart and pulmonary vessels.  No significant filling defects are noted to the central pulmonary arteries and central artery divisions to indicate pulmonary embolus.  Small bilateral pleural effusions are identified.  A few lymph nodes measuring up to 1 cm are seen within the mediastinum.  No axillary lymphadenopathy is identified.  The major airways are grossly patent.  Mild hazy ground-glass opacities evident at the lungs bilaterally.  There is patchy infiltrate and or atelectasis posterior lung bases.                                       X-Ray Chest AP Portable (Final result)  Result time  07/25/25 11:24:43      Final result by Felipe Kincaid MD (07/25/25 11:24:43)                   Impression:      1. Mild cardiomegaly  2. Hazy interstitial opacities lower lungs bilaterally suggesting a chronic process.  A superimposed acute component cannot be entirely excluded.  Clinical correlation is indicated  3. Thoracic spondylosis      Electronically signed by: Felipe Kincaid  Date:    07/25/2025  Time:    11:24               Narrative:    EXAMINATION:  XR CHEST AP PORTABLE    CLINICAL HISTORY:  , Other chest pain.    COMPARISON:  07/10/2025    FINDINGS:  An AP view or more reveals the heart to be mildly enlarged.  The trachea is to the right of midline.  Mild hazy interstitial opacities evident lower lungs bilaterally.  No consolidative infiltrate or effusion is seen.  Degenerative changes are noted to the thoracic spine.                                       Medications   sodium chloride 0.9% flush 10 mL (has no administration in time range)   melatonin tablet 6 mg (has no administration in time range)   carvediloL tablet 3.125 mg (3.125 mg Oral Given 7/25/25 2132)   mupirocin 2 % ointment (1 g Nasal Given 7/25/25 2131)   atorvastatin tablet 20 mg (has no administration in time range)   finasteride tablet 5 mg (has no administration in time range)   fluticasone furoate-vilanteroL 100-25 mcg/dose diskus inhaler 1 puff (has no administration in time range)   tamsulosin 24 hr capsule 0.4 mg (0.4 mg Oral Given 7/25/25 2131)   valsartan tablet 160 mg (has no administration in time range)   albuterol-ipratropium 2.5 mg-0.5 mg/3 mL nebulizer solution 3 mL (3 mLs Nebulization Given 7/26/25 0055)   pantoprazole EC tablet 40 mg (has no administration in time range)   enoxaparin injection 40 mg (has no administration in time range)   furosemide tablet 20 mg (has no administration in time range)   budesonide nebulizer solution 0.5 mg (0.5 mg Nebulization Given 7/25/25 1918)   albuterol-ipratropium 2.5 mg-0.5 mg/3 mL  nebulizer solution 3 mL (3 mLs Nebulization Given 7/25/25 1008)   furosemide injection 20 mg (20 mg Intravenous Given 7/25/25 1041)   iopamidoL (ISOVUE-370) injection 100 mL (100 mLs Intravenous Given 7/25/25 1238)   cefTRIAXone injection 1 g (1 g Intravenous Given 7/25/25 1342)   azithromycin (ZITHROMAX) 500 mg in 0.9% NaCl 250 mL IVPB (admixture device) (0 mg Intravenous Stopped 7/25/25 1457)   furosemide injection 20 mg (20 mg Intravenous Given 7/25/25 2131)   potassium chloride SA CR tablet 20 mEq (20 mEq Oral Given 7/25/25 2131)     Medical Decision Making  Amount and/or Complexity of Data Reviewed  Labs: ordered.  Radiology: ordered.    Risk  OTC drugs.  Prescription drug management.  Decision regarding hospitalization.    A 61-year-old male history of hypertension now with complaints of one-week history of increasing shortness of breath, productive cough with chest discomfort with coughing only.  Vital signs were stable in ED, afebrile, O2 sat 99% on room air.  Patient did have some minimal wheezing on exam with no peripheral edema noted.  Abdomen is soft nontender.  Labs essentially WNL including a normal troponin x 2.  Chest x-ray pending.  EKG with no acute ST elevations or ST depressions, normal sinus rhythm rate of 100 with nonspecific T-wave changes.  Patient received DuoNeb here in ED with some improvement of his dyspnea.  ABG with a pH of 7.49, pCO2 28, PO2 64, bicarb 21.  CTA of chest with no evidence of PE but does show bilateral small airway disease and small bilateral pleural effusions.  After blood cultures patient was given Rocephin 1 g IV and Zithromax 500 mg IV and will be admitted to primary care physician for further medical management.                                     Clinical Impression:  Final diagnoses:  [R07.89] Atypical chest pain (Primary)  [J18.9] Pneumonia  [I50.9] Acute on chronic congestive heart failure, unspecified heart failure type          ED Disposition Condition    Admit                          Ryan Villegas MD  07/26/25 0617         [1]   Social History  Tobacco Use    Smoking status: Never    Smokeless tobacco: Never   Vaping Use    Vaping status: Never Used   Substance Use Topics    Alcohol use: Not Currently    Drug use: Not Currently        Ryan Villegas MD  07/26/25 0618

## 2025-07-26 PROBLEM — R10.9 ABDOMINAL PAIN: Status: ACTIVE | Noted: 2025-07-26

## 2025-07-26 LAB
ANION GAP SERPL CALC-SCNC: 8 MEQ/L
BASOPHILS # BLD AUTO: 0.09 X10(3)/MCL
BASOPHILS NFR BLD AUTO: 1.1 %
BUN SERPL-MCNC: 12 MG/DL (ref 8.4–25.7)
CALCIUM SERPL-MCNC: 8.9 MG/DL (ref 8.8–10)
CHLORIDE SERPL-SCNC: 111 MMOL/L (ref 98–107)
CO2 SERPL-SCNC: 25 MMOL/L (ref 23–31)
CREAT SERPL-MCNC: 1.28 MG/DL (ref 0.72–1.25)
CREAT/UREA NIT SERPL: 9
EOSINOPHIL # BLD AUTO: 0.23 X10(3)/MCL (ref 0–0.9)
EOSINOPHIL NFR BLD AUTO: 2.9 %
ERYTHROCYTE [DISTWIDTH] IN BLOOD BY AUTOMATED COUNT: 13.6 % (ref 11.5–17)
GFR SERPLBLD CREATININE-BSD FMLA CKD-EPI: >60 ML/MIN/1.73/M2
GLUCOSE SERPL-MCNC: 107 MG/DL (ref 82–115)
HCT VFR BLD AUTO: 38.2 % (ref 42–52)
HGB BLD-MCNC: 12.5 G/DL (ref 14–18)
IMM GRANULOCYTES # BLD AUTO: 0.04 X10(3)/MCL (ref 0–0.04)
IMM GRANULOCYTES NFR BLD AUTO: 0.5 %
LYMPHOCYTES # BLD AUTO: 1.86 X10(3)/MCL (ref 0.6–4.6)
LYMPHOCYTES NFR BLD AUTO: 23.4 %
MAGNESIUM SERPL-MCNC: 2.1 MG/DL (ref 1.6–2.6)
MCH RBC QN AUTO: 28.4 PG (ref 27–31)
MCHC RBC AUTO-ENTMCNC: 32.7 G/DL (ref 33–36)
MCV RBC AUTO: 86.8 FL (ref 80–94)
MONOCYTES # BLD AUTO: 0.59 X10(3)/MCL (ref 0.1–1.3)
MONOCYTES NFR BLD AUTO: 7.4 %
NEUTROPHILS # BLD AUTO: 5.13 X10(3)/MCL (ref 2.1–9.2)
NEUTROPHILS NFR BLD AUTO: 64.7 %
NRBC BLD AUTO-RTO: 0 %
PHOSPHATE SERPL-MCNC: 4.2 MG/DL (ref 2.3–4.7)
PLATELET # BLD AUTO: 183 X10(3)/MCL (ref 130–400)
PMV BLD AUTO: 10.5 FL (ref 7.4–10.4)
POTASSIUM SERPL-SCNC: 3.7 MMOL/L (ref 3.5–5.1)
PSA SERPL-MCNC: 2.69 NG/ML
RBC # BLD AUTO: 4.4 X10(6)/MCL (ref 4.7–6.1)
SODIUM SERPL-SCNC: 144 MMOL/L (ref 136–145)
WBC # BLD AUTO: 7.94 X10(3)/MCL (ref 4.5–11.5)

## 2025-07-26 PROCEDURE — 36415 COLL VENOUS BLD VENIPUNCTURE: CPT | Performed by: INTERNAL MEDICINE

## 2025-07-26 PROCEDURE — 84153 ASSAY OF PSA TOTAL: CPT | Performed by: INTERNAL MEDICINE

## 2025-07-26 PROCEDURE — 83735 ASSAY OF MAGNESIUM: CPT | Performed by: INTERNAL MEDICINE

## 2025-07-26 PROCEDURE — 25000003 PHARM REV CODE 250: Performed by: INTERNAL MEDICINE

## 2025-07-26 PROCEDURE — 25000242 PHARM REV CODE 250 ALT 637 W/ HCPCS: Performed by: INTERNAL MEDICINE

## 2025-07-26 PROCEDURE — 99900035 HC TECH TIME PER 15 MIN (STAT)

## 2025-07-26 PROCEDURE — 63600175 PHARM REV CODE 636 W HCPCS: Performed by: INTERNAL MEDICINE

## 2025-07-26 PROCEDURE — 94799 UNLISTED PULMONARY SVC/PX: CPT

## 2025-07-26 PROCEDURE — 21400001 HC TELEMETRY ROOM

## 2025-07-26 PROCEDURE — 25500020 PHARM REV CODE 255: Performed by: INTERNAL MEDICINE

## 2025-07-26 PROCEDURE — 85025 COMPLETE CBC W/AUTO DIFF WBC: CPT | Performed by: INTERNAL MEDICINE

## 2025-07-26 PROCEDURE — 94761 N-INVAS EAR/PLS OXIMETRY MLT: CPT

## 2025-07-26 PROCEDURE — 80048 BASIC METABOLIC PNL TOTAL CA: CPT | Performed by: INTERNAL MEDICINE

## 2025-07-26 PROCEDURE — 84100 ASSAY OF PHOSPHORUS: CPT | Performed by: INTERNAL MEDICINE

## 2025-07-26 PROCEDURE — 94640 AIRWAY INHALATION TREATMENT: CPT

## 2025-07-26 PROCEDURE — 11000001 HC ACUTE MED/SURG PRIVATE ROOM

## 2025-07-26 PROCEDURE — 27000221 HC OXYGEN, UP TO 24 HOURS

## 2025-07-26 RX ORDER — CEFTRIAXONE 1 G/1
1 INJECTION, POWDER, FOR SOLUTION INTRAMUSCULAR; INTRAVENOUS
Status: DISCONTINUED | OUTPATIENT
Start: 2025-07-26 | End: 2025-07-30

## 2025-07-26 RX ORDER — LEVALBUTEROL INHALATION SOLUTION 1.25 MG/3ML
1.25 SOLUTION RESPIRATORY (INHALATION) EVERY 8 HOURS
Status: DISCONTINUED | OUTPATIENT
Start: 2025-07-27 | End: 2025-07-30

## 2025-07-26 RX ORDER — DIATRIZOATE MEGLUMINE AND DIATRIZOATE SODIUM 660; 100 MG/ML; MG/ML
60 SOLUTION ORAL; RECTAL
Status: COMPLETED | OUTPATIENT
Start: 2025-07-26 | End: 2025-07-26

## 2025-07-26 RX ADMIN — IPRATROPIUM BROMIDE AND ALBUTEROL SULFATE 3 ML: 2.5; .5 SOLUTION RESPIRATORY (INHALATION) at 07:07

## 2025-07-26 RX ADMIN — IPRATROPIUM BROMIDE AND ALBUTEROL SULFATE 3 ML: 2.5; .5 SOLUTION RESPIRATORY (INHALATION) at 12:07

## 2025-07-26 RX ADMIN — CEFTRIAXONE SODIUM 1 G: 1 INJECTION, POWDER, FOR SOLUTION INTRAMUSCULAR; INTRAVENOUS at 10:07

## 2025-07-26 RX ADMIN — MUPIROCIN 1 G: 20 OINTMENT TOPICAL at 09:07

## 2025-07-26 RX ADMIN — DIATRIZOATE MEGLUMINE AND DIATRIZOATE SODIUM 60 ML: 660; 100 LIQUID ORAL; RECTAL at 01:07

## 2025-07-26 RX ADMIN — FUROSEMIDE 20 MG: 20 TABLET ORAL at 05:07

## 2025-07-26 RX ADMIN — FUROSEMIDE 20 MG: 20 TABLET ORAL at 08:07

## 2025-07-26 RX ADMIN — BUDESONIDE INHALATION 0.5 MG: 0.5 SUSPENSION RESPIRATORY (INHALATION) at 07:07

## 2025-07-26 RX ADMIN — TAMSULOSIN HYDROCHLORIDE 0.4 MG: 0.4 CAPSULE ORAL at 09:07

## 2025-07-26 RX ADMIN — CARVEDILOL 3.12 MG: 3.12 TABLET, FILM COATED ORAL at 09:07

## 2025-07-26 RX ADMIN — PANTOPRAZOLE SODIUM 40 MG: 40 TABLET, DELAYED RELEASE ORAL at 08:07

## 2025-07-26 RX ADMIN — ATORVASTATIN CALCIUM 20 MG: 20 TABLET, FILM COATED ORAL at 08:07

## 2025-07-26 RX ADMIN — MUPIROCIN 1 G: 20 OINTMENT TOPICAL at 08:07

## 2025-07-26 RX ADMIN — CARVEDILOL 3.12 MG: 3.12 TABLET, FILM COATED ORAL at 08:07

## 2025-07-26 RX ADMIN — FINASTERIDE 5 MG: 5 TABLET, FILM COATED ORAL at 08:07

## 2025-07-26 RX ADMIN — VALSARTAN 160 MG: 160 TABLET, FILM COATED ORAL at 08:07

## 2025-07-26 RX ADMIN — ENOXAPARIN SODIUM 40 MG: 40 INJECTION SUBCUTANEOUS at 05:07

## 2025-07-26 RX ADMIN — AZITHROMYCIN MONOHYDRATE 500 MG: 500 INJECTION, POWDER, LYOPHILIZED, FOR SOLUTION INTRAVENOUS at 10:07

## 2025-07-26 NOTE — PROGRESS NOTES
Progress Note    Admit Date: 7/25/2025    DATE OF SERVICE 07/26         SUBJECTIVE:     Follow-up For:  Shortness of breath.  CT was negative for PE.  Today he is less short of breath than when he came in.  However upon exam he does have tenderness to his abdomen.  Mostly in the right upper quadrant.    Scheduled Meds:  He has 2 visitors, his neighbors, in the room with him today.   albuterol-ipratropium  3 mL Nebulization Q6H    atorvastatin  20 mg Oral Daily    azithromycin  500 mg Intravenous Q24H    budesonide  0.5 mg Nebulization Q12H    carvediloL  3.125 mg Oral BID    cefTRIAXone (Rocephin) IV (PEDS and ADULTS)  1 g Intravenous Q24H    enoxparin  40 mg Subcutaneous Q24H (prophylaxis, 1700)    finasteride  5 mg Oral Daily    fluticasone furoate-vilanteroL  1 puff Inhalation Daily    furosemide  20 mg Oral BID    mupirocin   Nasal BID    pantoprazole  40 mg Oral Daily    tamsulosin  0.4 mg Oral QHS    valsartan  160 mg Oral Daily     Continuous Infusions:  PRN Meds:  Current Facility-Administered Medications:     melatonin, 6 mg, Oral, Nightly PRN    sodium chloride 0.9%, 10 mL, Intravenous, PRN    Review of patient's allergies indicates:  No Known Allergies    Review of Systems  ROS    OBJECTIVE:     Vital Signs (Most Recent)  Temp: 98.7 °F (37.1 °C) (07/26/25 0831)  Pulse: 108 (07/26/25 0800)  Resp: (!) 25 (07/26/25 0800)  BP: 132/86 (07/26/25 0800)  SpO2: (!) 93 % (07/26/25 0800)    Vital Signs Range (Last 24H):  Temp:  [97.5 °F (36.4 °C)-98.7 °F (37.1 °C)]   Pulse:  []   Resp:  [12-43]   BP: (115-141)/(75-93)   SpO2:  [86 %-99 %]     I & O (Last 24H):  Intake/Output Summary (Last 24 hours) at 7/26/2025 1057  Last data filed at 7/26/2025 0614  Gross per 24 hour   Intake 550.2 ml   Output 3175 ml   Net -2624.8 ml     Physical Exam:  Physical Exam   Vitals:    07/26/25 0831   BP:    Pulse:    Resp:    Temp: 98.7 °F (37.1 °C)       Physical Exam   Constitutional: alert & oriented, no acute distress  HENT:    Head: Normocephalic and atraumatic.   Eyes: Conjunctivae normal and EOM are normal. Pupils are equal, round, and reactive to light.   Neck: Normal range of motion. Neck supple.   Cardiovascular: Normal rate, regular rhythm, normal heart sounds   Pulmonary/Chest: CTAB, diffuse rhonchi.  nonlabored respiration  Abdominal: Soft, tender diffusely however more tender in the right upper quadrant.  Also complained of some shoulder pain.  bowel sounds normal  Neurological: nonfocal  Skin: warm, dry intact  Psychiatric: normal mood and affect, cooperative        Laboratory:  Recent Results (from the past 24 hours)   Troponin I High Sensitivity    Collection Time: 07/25/25 11:29 AM   Result Value Ref Range    Troponin High Sensitive 22 <=35 ng/L   ISTAT PROCEDURE    Collection Time: 07/25/25 12:51 PM   Result Value Ref Range    POC PH 7.493 (H) 7.35 - 7.45    POC PCO2 28.1 (LL) 35 - 45 mmHg    POC PO2 64 (L) 80 - 100 mmHg    POC HCO3 21.5 (L) 24 - 28 mmol/L    POC BE -2 -2 to 2 mmol/L    POC SATURATED O2 94 95 - 100 %    POC TCO2 22 (L) 23 - 27 mmol/L    Sample ARTERIAL     Site RR     Allens Test Pass     DelSys Room Air    Lactic acid, plasma    Collection Time: 07/25/25  1:42 PM   Result Value Ref Range    Lactic Acid Level 1.6 0.5 - 2.2 mmol/L   Basic Metabolic Panel    Collection Time: 07/26/25  3:40 AM   Result Value Ref Range    Sodium 144 136 - 145 mmol/L    Potassium 3.7 3.5 - 5.1 mmol/L    Chloride 111 (H) 98 - 107 mmol/L    CO2 25 23 - 31 mmol/L    Glucose 107 82 - 115 mg/dL    Blood Urea Nitrogen 12.0 8.4 - 25.7 mg/dL    Creatinine 1.28 (H) 0.72 - 1.25 mg/dL    BUN/Creatinine Ratio 9     Calcium 8.9 8.8 - 10.0 mg/dL    Anion Gap 8.0 mEq/L    eGFR >60 mL/min/1.73/m2   Magnesium    Collection Time: 07/26/25  3:40 AM   Result Value Ref Range    Magnesium Level 2.10 1.60 - 2.60 mg/dL   Phosphorus    Collection Time: 07/26/25  3:40 AM   Result Value Ref Range    Phosphorus Level 4.2 2.3 - 4.7 mg/dL   PSA, Total  (Diagnostic)    Collection Time: 07/26/25  3:40 AM   Result Value Ref Range    Prostate Specific Antigen 2.69 <=4.00 ng/mL   CBC with Differential    Collection Time: 07/26/25  3:40 AM   Result Value Ref Range    WBC 7.94 4.50 - 11.50 x10(3)/mcL    RBC 4.40 (L) 4.70 - 6.10 x10(6)/mcL    Hgb 12.5 (L) 14.0 - 18.0 g/dL    Hct 38.2 (L) 42.0 - 52.0 %    MCV 86.8 80.0 - 94.0 fL    MCH 28.4 27.0 - 31.0 pg    MCHC 32.7 (L) 33.0 - 36.0 g/dL    RDW 13.6 11.5 - 17.0 %    Platelet 183 130 - 400 x10(3)/mcL    MPV 10.5 (H) 7.4 - 10.4 fL    Neut % 64.7 %    Lymph % 23.4 %    Mono % 7.4 %    Eos % 2.9 %    Basophil % 1.1 %    Imm Grans % 0.5 %    Neut # 5.13 2.1 - 9.2 x10(3)/mcL    Lymph # 1.86 0.6 - 4.6 x10(3)/mcL    Mono # 0.59 0.1 - 1.3 x10(3)/mcL    Eos # 0.23 0 - 0.9 x10(3)/mcL    Baso # 0.09 <=0.2 x10(3)/mcL    Imm Gran # 0.04 0.00 - 0.04 x10(3)/mcL    NRBC% 0.0 %        Diagnostic Results:  X-ray Shoulder 2 or More Views Right  Result Date: 7/26/2025  EXAMINATION: XR SHOULDER COMPLETE 2 OR MORE VIEWS RIGHT CLINICAL HISTORY: shoulder pain; TECHNIQUE: Two or three views of the right shoulder were performed. COMPARISON: None FINDINGS: No fracture dislocation.  Joint space narrowing is identified of the acromioclavicular joint as well as of the glenohumeral joint with spurring.  Subacromial space is maintained. The soft tissues are grossly normal.  No joint effusion. The visualized lungs are clear the visualized ribs are intact.     No fracture dislocation right shoulder.  Degenerative changes are noted. Electronically signed by: Ermias Tovar MD Date:    07/26/2025 Time:    09:33    CTA Chest Non-Coronary (PE Studies)  Result Date: 7/25/2025  EXAMINATION: CT ANGIOGRAM CHEST WITH IV CONTRAST: CLINICAL HISTORY: High clinical suspicion for pulmonary embolus TECHNIQUE: PATIENT RADIATION DOSE: DLP(mGycm) 305 As per PQRS measures, all CT scans at this facility used dose modulation, iterative reconstruction, and/or weight based dose  adjustment when appropriate to reduce radiation dose to as low as reasonably achievable. COMPARISON: None available FINDINGS: Serial axial images were obtained of the chest with the administration of IV contrast. Additional coronal and sagittal mip reconstructions as well as 3-D rotational spin reconstructions were performed. NASCET criteria was utilized. Degenerative changes are noted to the thoracolumbar spine.  There is heterogeneity of the thyroid lobes bilaterally.  Atherosclerosis seen within the aorta and branching vessels.  Heart is enlarged.  Contrast identified within the heart and pulmonary vessels.  No significant filling defects are noted to the central pulmonary arteries and central artery divisions to indicate pulmonary embolus.  Small bilateral pleural effusions are identified.  A few lymph nodes measuring up to 1 cm are seen within the mediastinum.  No axillary lymphadenopathy is identified.  The major airways are grossly patent.  Mild hazy ground-glass opacities evident at the lungs bilaterally.  There is patchy infiltrate and or atelectasis posterior lung bases.     1. No significant filling defects noted to the pulmonary arteries indicate pulmonary embolus 2. Cardiomegaly 3. Small bilateral pleural effusion with associated patchy infiltrate and or atelectasis lung bases 4. Hazy ground-glass opacities throughout the lungs bilaterally which may represent small airway disease.  A atypical pneumonia cannot be excluded.  Clinical correlation is indicated 5. Findings and other details as above Electronically signed by: Felipe Kincaid Date:    07/25/2025 Time:    13:12    X-Ray Chest AP Portable  Result Date: 7/25/2025  EXAMINATION: XR CHEST AP PORTABLE CLINICAL HISTORY: , Other chest pain. COMPARISON: 07/10/2025 FINDINGS: An AP view or more reveals the heart to be mildly enlarged.  The trachea is to the right of midline.  Mild hazy interstitial opacities evident lower lungs bilaterally.  No  consolidative infiltrate or effusion is seen.  Degenerative changes are noted to the thoracic spine.     1. Mild cardiomegaly 2. Hazy interstitial opacities lower lungs bilaterally suggesting a chronic process.  A superimposed acute component cannot be entirely excluded.  Clinical correlation is indicated 3. Thoracic spondylosis Electronically signed by: Felipe Kincaid Date:    07/25/2025 Time:    11:24       ASSESSMENT/PLAN:       Plan:   Patient Active Problem List    Diagnosis Date Noted    Acute on chronic congestive heart failure 07/25/2025    Pneumonia 07/25/2025    Atypical chest pain 07/25/2025      Shortness of breath multifactorial, a pneumonic process may be at play versus congestive heart failure.  Echo pending.  Patient on Zithromax and ceftriaxone to cover him for pneumonic process.      He is on pantoprazole for any GI process that may be going on such as GERD that might be causing him to have this cough and also in the referred pain.    Blood pressure is at goal.  GERD being treated with pantoprazole   History of BPH with hypoplasia.  With LUTS.  On Flomax.  History of reactive airway.  On Symbicort.  Tachycardia pulse rate lowering to 108.  We will monitor may need to increase his Coreg.  Abdominal pain right upper quadrant.  Unable to get an ultrasound we will do a CT.

## 2025-07-26 NOTE — PLAN OF CARE
Problem: Hospitalized Older Adult  Goal: Optimal Cognitive Function  Outcome: Progressing  Goal: Effective Bowel Elimination  Outcome: Progressing  Goal: Optimal Coping  Outcome: Progressing  Goal: Fluid and Electrolyte Balance  Outcome: Progressing  Goal: Optimal Functional Ability  Outcome: Progressing  Goal: Improved Oral Intake  Outcome: Progressing  Goal: Adequate Sleep/Rest  Outcome: Progressing  Goal: Effective Urinary Elimination  Outcome: Progressing     Problem: Adult Inpatient Plan of Care  Goal: Plan of Care Review  Outcome: Progressing  Goal: Patient-Specific Goal (Individualized)  Outcome: Progressing  Goal: Absence of Hospital-Acquired Illness or Injury  Outcome: Progressing  Goal: Optimal Comfort and Wellbeing  Outcome: Progressing  Goal: Readiness for Transition of Care  Outcome: Progressing     Problem: Comorbidity Management  Goal: Blood Pressure in Desired Range  Outcome: Progressing     Problem: Gas Exchange Impaired  Goal: Optimal Gas Exchange  Outcome: Progressing     Problem: Pneumonia  Goal: Fluid Balance  Outcome: Progressing  Goal: Resolution of Infection Signs and Symptoms  Outcome: Progressing  Goal: Effective Oxygenation and Ventilation  Outcome: Progressing

## 2025-07-26 NOTE — H&P
[facility]                     [address] [address]    PATIENT NAME:       RUBEN HUTCHINSON  YOB: 1955  CSN:                [id number]   MRN:                [id number]  ADMIT DATE:         07/25/2025 09:19:00  PHYSICIAN:          Erich Borja MD                        HISTORY AND PHYSICAL      CHIEF COMPLAINT:  The patient came to the emergency room complaining of   shortness of breath.    HISTORY OF PRESENT ILLNESS:  Mr. Ruben Hutchinson is a very pleasant 69-year-old   gentleman, resident of [place], who has a few medical problems, had come to the   emergency room on 07/10/2025 complaining of tinnitus in the right ear and   shortness of breath on exertion.  He was found to have early signs of pulmonary   congestion.  BNP was slightly elevated at 301.  He was given a prescription of   Lasix and was sent back to continue using his Symbicort, but patient again came   today with the same complaint, that his shortness of breath was getting worse.    The ER physician worked on the patient and at this time, we also did an ABG,   which showed that the patient had a pH of 7.49, pCO2 of 28, but he was hypoxic   at 64 with saturation at 94% on room air.  He also was tachycardic.  Troponin   was negative at 0.03.  His chest x-ray shows a hazy interstitial opacities in   lower lungs bilaterally suggesting a chronic process.  A superimposed component   cannot be excluded.  We did a CAT scan from the PE protocol because of the   tachycardia with shortness of breath.  He had no pulmonary embolus, but he had   bilateral pleural effusion with associated patchy infiltrate and/or atelectasis   and again hazy ground-glass opacities throughout the lungs bilaterally, which   may represent small airway disease.  Atypical pneumonia cannot be excluded.    Clinical correlation is indicated.  The patient was admitted for shortness of    breath of multifactorial origin.  His BNP was found to be, in spite of being on   Lasix, at 901.  So, he had a multifactorial shortness of breath from pneumonic   process versus small airway disease versus early evidence of congestive heart   failure, ejection fraction unknown, echocardiogram has been added and compounded   with his obesity.  Besides that, patient did not have any other systems   specific complaint.    PAST MEDICAL HISTORY:  Significant for GERD, BPH with LUTS, hypertension,   dyslipidemia.    PAST SURGICAL HISTORY:  Pertinent only for circumcision.    SOCIAL HISTORY:  The patient is a resident of [place].  He lives in an apartment.    He has a friend who helps with daily needs.    FAMILY HISTORY:  Pertinent that he does not know much about his mother and   father.  He was raised in a foster home.  He was in [place], [place].  He has 3   sisters and 1 brother.  He does not know much about it because he got    long time ago.    MEDICATIONS:  On which the patient is on are as follows;  1. Finasteride 5 mg daily.  2. Tamsulosin 0.4 mg daily.  3. Atorvastatin 20 mg daily.  4. Symbicort 160/4.5 two puffs b.i.d.  5. Omeprazole 40 mg daily.  6. Vitamin D 50,000 units q.week.  7. Amlodipine and valsartan 10/160 one tablet daily.    REVIEW OF SYSTEMS:  As mentioned in history of present illness.    PHYSICAL EXAMINATION:  GENERAL:  The patient is alert and oriented x3.  He is sitting up in the bed,   still having some shortness of breath.  He feels better sitting upright.  VITAL SIGNS: Blood pressure 122/91, temperature 97.5, O2 sat 91% on room air, I   put him on 2 L.  Pulse is 110.  HEENT:  Normocephalic.  Pupils bilaterally reactive, equal in size.  Mild   conjunctival pallor.  No scleral icterus.  Trachea is midline.  CHEST:  Has bilateral diffuse rhonchi and few crepitations.  CVS:  First and second heart sounds are heard normally without any murmurs.   ABDOMEN:  Soft, nontender, and obese.    EXTREMITIES:  Devoid of edema, cyanosis, or clubbing.    LABS AND INVESTIGATIONS:  Sodium 143, potassium 3.5, chloride 109, bicarb 26,   BUN 11, creatinine 1.30, GFR of 60, glucose 115, calcium 9.1.  Lactic acid 1.6.    .  WBC 7.4, hemoglobin 13.1, hematocrit 40.0, MCV 86.8, platelet count   adequate.  PT/INR within normal limits.  D-dimer less than 0.27.  ABG on room   air; pH 7.4, pCO2 28, PO2 64, saturation 94%.  Chest x-ray; mild cardiomegaly,   hazy interstitial opacities in lower lungs bilaterally suggesting a chronic   process.  A superimposed acute process cannot be ruled out.  CAT scan of the   chest confirms the findings of chest x-ray, no pulmonary embolus.  EKG; sinus   rhythm, nonspecific ST-T changes.    IMPRESSION:    1. Shortness of breath, multifactorial, pneumonic process versus small airway   disease versus congestive heart failure secondary to most likely diastolic   dysfunction.  Echocardiogram awaited.  2. History of hypertension.  3. History of obesity.  4. History of gastroesophageal reflux disease.  5. History of benign prostatic hyperplasia with lower urinary tract symptoms.  6. History of hyperreactive airway disease, on Symbicort.    PLAN:  To admit the patient.  Small volume nebulizers, supplemental oxygen, IV   Lasix.  Continue with essential home medications.  For the tachycardia, we are   going to introduce Coreg in the small dose.  DVT prophylaxis with Lovenox.  GI   prophylaxis with proton-pump inhibitors.  Rest of the decision will be made upon   the report of the echocardiogram.        ______________________________  Erich Borja MD    SS/YAREDS  DD:  07/25/2025  Time:  06:56PM  DT:  07/25/2025  Time:  09:17PM  Job #:  [id number]      HISTORY AND PHYSICAL

## 2025-07-27 LAB
ALBUMIN SERPL-MCNC: 3.5 G/DL (ref 3.4–4.8)
ALBUMIN/GLOB SERPL: 0.9 RATIO (ref 1.1–2)
ALP SERPL-CCNC: 59 UNIT/L (ref 40–150)
ALT SERPL-CCNC: 26 UNIT/L (ref 0–55)
ANION GAP SERPL CALC-SCNC: 10 MEQ/L
AST SERPL-CCNC: 23 UNIT/L (ref 11–45)
BASOPHILS # BLD AUTO: 0.09 X10(3)/MCL
BASOPHILS NFR BLD AUTO: 0.8 %
BILIRUB SERPL-MCNC: 0.7 MG/DL
BUN SERPL-MCNC: 14 MG/DL (ref 8.4–25.7)
CALCIUM SERPL-MCNC: 8.6 MG/DL (ref 8.8–10)
CHLORIDE SERPL-SCNC: 109 MMOL/L (ref 98–107)
CO2 SERPL-SCNC: 22 MMOL/L (ref 23–31)
CREAT SERPL-MCNC: 1.18 MG/DL (ref 0.72–1.25)
CREAT/UREA NIT SERPL: 12
EOSINOPHIL # BLD AUTO: 0.31 X10(3)/MCL (ref 0–0.9)
EOSINOPHIL NFR BLD AUTO: 2.7 %
ERYTHROCYTE [DISTWIDTH] IN BLOOD BY AUTOMATED COUNT: 13.5 % (ref 11.5–17)
GFR SERPLBLD CREATININE-BSD FMLA CKD-EPI: >60 ML/MIN/1.73/M2
GLOBULIN SER-MCNC: 3.9 GM/DL (ref 2.4–3.5)
GLUCOSE SERPL-MCNC: 103 MG/DL (ref 82–115)
HCT VFR BLD AUTO: 40.4 % (ref 42–52)
HGB BLD-MCNC: 13.3 G/DL (ref 14–18)
IMM GRANULOCYTES # BLD AUTO: 0.04 X10(3)/MCL (ref 0–0.04)
IMM GRANULOCYTES NFR BLD AUTO: 0.4 %
LYMPHOCYTES # BLD AUTO: 1.93 X10(3)/MCL (ref 0.6–4.6)
LYMPHOCYTES NFR BLD AUTO: 16.9 %
MAGNESIUM SERPL-MCNC: 2 MG/DL (ref 1.6–2.6)
MCH RBC QN AUTO: 28.5 PG (ref 27–31)
MCHC RBC AUTO-ENTMCNC: 32.9 G/DL (ref 33–36)
MCV RBC AUTO: 86.5 FL (ref 80–94)
MONOCYTES # BLD AUTO: 0.8 X10(3)/MCL (ref 0.1–1.3)
MONOCYTES NFR BLD AUTO: 7 %
NEUTROPHILS # BLD AUTO: 8.25 X10(3)/MCL (ref 2.1–9.2)
NEUTROPHILS NFR BLD AUTO: 72.2 %
NRBC BLD AUTO-RTO: 0 %
PHOSPHATE SERPL-MCNC: 3.6 MG/DL (ref 2.3–4.7)
PLATELET # BLD AUTO: 180 X10(3)/MCL (ref 130–400)
PMV BLD AUTO: 10 FL (ref 7.4–10.4)
POTASSIUM SERPL-SCNC: 3.4 MMOL/L (ref 3.5–5.1)
PROT SERPL-MCNC: 7.4 GM/DL (ref 5.8–7.6)
RBC # BLD AUTO: 4.67 X10(6)/MCL (ref 4.7–6.1)
SODIUM SERPL-SCNC: 141 MMOL/L (ref 136–145)
WBC # BLD AUTO: 11.42 X10(3)/MCL (ref 4.5–11.5)

## 2025-07-27 PROCEDURE — 83735 ASSAY OF MAGNESIUM: CPT | Performed by: FAMILY MEDICINE

## 2025-07-27 PROCEDURE — 85025 COMPLETE CBC W/AUTO DIFF WBC: CPT | Performed by: FAMILY MEDICINE

## 2025-07-27 PROCEDURE — 11000001 HC ACUTE MED/SURG PRIVATE ROOM

## 2025-07-27 PROCEDURE — 25000003 PHARM REV CODE 250: Performed by: FAMILY MEDICINE

## 2025-07-27 PROCEDURE — 21400001 HC TELEMETRY ROOM

## 2025-07-27 PROCEDURE — 94761 N-INVAS EAR/PLS OXIMETRY MLT: CPT

## 2025-07-27 PROCEDURE — 99900035 HC TECH TIME PER 15 MIN (STAT)

## 2025-07-27 PROCEDURE — 84100 ASSAY OF PHOSPHORUS: CPT | Performed by: FAMILY MEDICINE

## 2025-07-27 PROCEDURE — 63600175 PHARM REV CODE 636 W HCPCS: Performed by: INTERNAL MEDICINE

## 2025-07-27 PROCEDURE — 94640 AIRWAY INHALATION TREATMENT: CPT

## 2025-07-27 PROCEDURE — 25000242 PHARM REV CODE 250 ALT 637 W/ HCPCS: Performed by: FAMILY MEDICINE

## 2025-07-27 PROCEDURE — 25000003 PHARM REV CODE 250: Performed by: INTERNAL MEDICINE

## 2025-07-27 PROCEDURE — 25000242 PHARM REV CODE 250 ALT 637 W/ HCPCS: Performed by: INTERNAL MEDICINE

## 2025-07-27 PROCEDURE — 80053 COMPREHEN METABOLIC PANEL: CPT | Performed by: FAMILY MEDICINE

## 2025-07-27 PROCEDURE — 36415 COLL VENOUS BLD VENIPUNCTURE: CPT | Performed by: FAMILY MEDICINE

## 2025-07-27 PROCEDURE — 27000221 HC OXYGEN, UP TO 24 HOURS

## 2025-07-27 RX ORDER — POTASSIUM CHLORIDE 20 MEQ/1
20 TABLET, EXTENDED RELEASE ORAL DAILY
Status: DISCONTINUED | OUTPATIENT
Start: 2025-07-27 | End: 2025-07-28

## 2025-07-27 RX ADMIN — VALSARTAN 160 MG: 160 TABLET, FILM COATED ORAL at 09:07

## 2025-07-27 RX ADMIN — MUPIROCIN 1 G: 20 OINTMENT TOPICAL at 09:07

## 2025-07-27 RX ADMIN — TAMSULOSIN HYDROCHLORIDE 0.4 MG: 0.4 CAPSULE ORAL at 09:07

## 2025-07-27 RX ADMIN — CARVEDILOL 3.12 MG: 3.12 TABLET, FILM COATED ORAL at 09:07

## 2025-07-27 RX ADMIN — BUDESONIDE INHALATION 0.5 MG: 0.5 SUSPENSION RESPIRATORY (INHALATION) at 07:07

## 2025-07-27 RX ADMIN — POTASSIUM CHLORIDE 20 MEQ: 1500 TABLET, EXTENDED RELEASE ORAL at 12:07

## 2025-07-27 RX ADMIN — FUROSEMIDE 20 MG: 20 TABLET ORAL at 05:07

## 2025-07-27 RX ADMIN — ATORVASTATIN CALCIUM 20 MG: 20 TABLET, FILM COATED ORAL at 09:07

## 2025-07-27 RX ADMIN — FLUTICASONE FUROATE AND VILANTEROL TRIFENATATE 1 PUFF: 100; 25 POWDER RESPIRATORY (INHALATION) at 09:07

## 2025-07-27 RX ADMIN — LEVALBUTEROL HYDROCHLORIDE 1.25 MG: 1.25 SOLUTION RESPIRATORY (INHALATION) at 12:07

## 2025-07-27 RX ADMIN — Medication 6 MG: at 09:07

## 2025-07-27 RX ADMIN — AZITHROMYCIN MONOHYDRATE 500 MG: 500 INJECTION, POWDER, LYOPHILIZED, FOR SOLUTION INTRAVENOUS at 12:07

## 2025-07-27 RX ADMIN — ENOXAPARIN SODIUM 40 MG: 40 INJECTION SUBCUTANEOUS at 05:07

## 2025-07-27 RX ADMIN — FUROSEMIDE 20 MG: 20 TABLET ORAL at 09:07

## 2025-07-27 RX ADMIN — FINASTERIDE 5 MG: 5 TABLET, FILM COATED ORAL at 09:07

## 2025-07-27 RX ADMIN — PANTOPRAZOLE SODIUM 40 MG: 40 TABLET, DELAYED RELEASE ORAL at 09:07

## 2025-07-27 RX ADMIN — LEVALBUTEROL HYDROCHLORIDE 1.25 MG: 1.25 SOLUTION RESPIRATORY (INHALATION) at 03:07

## 2025-07-27 RX ADMIN — CEFTRIAXONE SODIUM 1 G: 1 INJECTION, POWDER, FOR SOLUTION INTRAMUSCULAR; INTRAVENOUS at 11:07

## 2025-07-27 RX ADMIN — LEVALBUTEROL HYDROCHLORIDE 1.25 MG: 1.25 SOLUTION RESPIRATORY (INHALATION) at 07:07

## 2025-07-27 NOTE — PROGRESS NOTES
Progress Note    Admit Date: 7/25/2025    DATE OF SERVICE 07/27        SUBJECTIVE:     Follow-up For:  Shortness of breath.  CT was negative for PE.  Today he is less short of breath than when he came in.  However upon exam he does have tenderness to his abdomen.  Mostly in the right upper quadrant.    Scheduled Meds:  He has 2 visitors, his neighbors, in the room with him today.  7/27  He has visitors today   Had oral contrast yesterday   Yeilded  several bowel movements     atorvastatin  20 mg Oral Daily    azithromycin  500 mg Intravenous Q24H    budesonide  0.5 mg Nebulization Q12H    carvediloL  3.125 mg Oral BID    cefTRIAXone (Rocephin) IV (PEDS and ADULTS)  1 g Intravenous Q24H    enoxparin  40 mg Subcutaneous Q24H (prophylaxis, 1700)    finasteride  5 mg Oral Daily    fluticasone furoate-vilanteroL  1 puff Inhalation Daily    furosemide  20 mg Oral BID    levalbuterol  1.25 mg Nebulization Q8H    mupirocin   Nasal BID    pantoprazole  40 mg Oral Daily    potassium chloride  20 mEq Oral Daily    tamsulosin  0.4 mg Oral QHS    valsartan  160 mg Oral Daily     Continuous Infusions:  PRN Meds:  Current Facility-Administered Medications:     melatonin, 6 mg, Oral, Nightly PRN    sodium chloride 0.9%, 10 mL, Intravenous, PRN    Review of patient's allergies indicates:  No Known Allergies    Review of Systems  ROS    OBJECTIVE:     Vital Signs (Most Recent)  Temp: 98.4 °F (36.9 °C) (07/27/25 0733)  Pulse: 85 (07/27/25 0925)  Resp: 20 (07/27/25 0925)  BP: 131/87 (07/27/25 0733)  SpO2: (!) 92 % (07/27/25 0733)    Vital Signs Range (Last 24H):  Temp:  [97.8 °F (36.6 °C)-99 °F (37.2 °C)]   Pulse:  []   Resp:  [16-25]   BP: (121-154)/(85-97)   SpO2:  [91 %-98 %]     I & O (Last 24H):  Intake/Output Summary (Last 24 hours) at 7/27/2025 1035  Last data filed at 7/27/2025 0800  Gross per 24 hour   Intake 730 ml   Output --   Net 730 ml     Physical Exam:  Physical Exam   Vitals:    07/27/25 0925   BP:    Pulse: 85    Resp: 20   Temp:        Physical Exam   Constitutional: alert & oriented, no acute distress  HENT:   Head: Normocephalic and atraumatic.   Eyes: Conjunctivae normal and EOM are normal. Pupils are equal, round, and reactive to light.   Neck: Normal range of motion. Neck supple.   Cardiovascular: Normal rate, regular rhythm, normal heart sounds   Pulmonary/Chest: CTAB, diffuse rhonchi.  nonlabored respiration  Abdominal: Soft, tender diffusely however more tender in the right upper quadrant.  Also complained of some shoulder pain.  bowel sounds normal  Neurological: nonfocal  Skin: warm, dry intact  Psychiatric: normal mood and affect, cooperative        Laboratory:  Recent Results (from the past 24 hours)   Comprehensive Metabolic Panel    Collection Time: 07/27/25  3:12 AM   Result Value Ref Range    Sodium 141 136 - 145 mmol/L    Potassium 3.4 (L) 3.5 - 5.1 mmol/L    Chloride 109 (H) 98 - 107 mmol/L    CO2 22 (L) 23 - 31 mmol/L    Glucose 103 82 - 115 mg/dL    Blood Urea Nitrogen 14.0 8.4 - 25.7 mg/dL    Creatinine 1.18 0.72 - 1.25 mg/dL    Calcium 8.6 (L) 8.8 - 10.0 mg/dL    Protein Total 7.4 5.8 - 7.6 gm/dL    Albumin 3.5 3.4 - 4.8 g/dL    Globulin 3.9 (H) 2.4 - 3.5 gm/dL    Albumin/Globulin Ratio 0.9 (L) 1.1 - 2.0 ratio    Bilirubin Total 0.7 <=1.5 mg/dL    ALP 59 40 - 150 unit/L    ALT 26 0 - 55 unit/L    AST 23 11 - 45 unit/L    eGFR >60 mL/min/1.73/m2    Anion Gap 10.0 mEq/L    BUN/Creatinine Ratio 12    Magnesium    Collection Time: 07/27/25  3:12 AM   Result Value Ref Range    Magnesium Level 2.00 1.60 - 2.60 mg/dL   Phosphorus    Collection Time: 07/27/25  3:12 AM   Result Value Ref Range    Phosphorus Level 3.6 2.3 - 4.7 mg/dL   CBC with Differential    Collection Time: 07/27/25  3:12 AM   Result Value Ref Range    WBC 11.42 4.50 - 11.50 x10(3)/mcL    RBC 4.67 (L) 4.70 - 6.10 x10(6)/mcL    Hgb 13.3 (L) 14.0 - 18.0 g/dL    Hct 40.4 (L) 42.0 - 52.0 %    MCV 86.5 80.0 - 94.0 fL    MCH 28.5 27.0 - 31.0 pg     MCHC 32.9 (L) 33.0 - 36.0 g/dL    RDW 13.5 11.5 - 17.0 %    Platelet 180 130 - 400 x10(3)/mcL    MPV 10.0 7.4 - 10.4 fL    Neut % 72.2 %    Lymph % 16.9 %    Mono % 7.0 %    Eos % 2.7 %    Basophil % 0.8 %    Imm Grans % 0.4 %    Neut # 8.25 2.1 - 9.2 x10(3)/mcL    Lymph # 1.93 0.6 - 4.6 x10(3)/mcL    Mono # 0.80 0.1 - 1.3 x10(3)/mcL    Eos # 0.31 0 - 0.9 x10(3)/mcL    Baso # 0.09 <=0.2 x10(3)/mcL    Imm Gran # 0.04 0.00 - 0.04 x10(3)/mcL    NRBC% 0.0 %        Diagnostic Results:  X-ray Shoulder 2 or More Views Right  Result Date: 7/26/2025  EXAMINATION: XR SHOULDER COMPLETE 2 OR MORE VIEWS RIGHT CLINICAL HISTORY: shoulder pain; TECHNIQUE: Two or three views of the right shoulder were performed. COMPARISON: None FINDINGS: No fracture dislocation.  Joint space narrowing is identified of the acromioclavicular joint as well as of the glenohumeral joint with spurring.  Subacromial space is maintained. The soft tissues are grossly normal.  No joint effusion. The visualized lungs are clear the visualized ribs are intact.     No fracture dislocation right shoulder.  Degenerative changes are noted. Electronically signed by: Ermias Tovar MD Date:    07/26/2025 Time:    09:33    CTA Chest Non-Coronary (PE Studies)  Result Date: 7/25/2025  EXAMINATION: CT ANGIOGRAM CHEST WITH IV CONTRAST: CLINICAL HISTORY: High clinical suspicion for pulmonary embolus TECHNIQUE: PATIENT RADIATION DOSE: DLP(mGycm) 305 As per PQRS measures, all CT scans at this facility used dose modulation, iterative reconstruction, and/or weight based dose adjustment when appropriate to reduce radiation dose to as low as reasonably achievable. COMPARISON: None available FINDINGS: Serial axial images were obtained of the chest with the administration of IV contrast. Additional coronal and sagittal mip reconstructions as well as 3-D rotational spin reconstructions were performed. NASCET criteria was utilized. Degenerative changes are noted to the thoracolumbar  spine.  There is heterogeneity of the thyroid lobes bilaterally.  Atherosclerosis seen within the aorta and branching vessels.  Heart is enlarged.  Contrast identified within the heart and pulmonary vessels.  No significant filling defects are noted to the central pulmonary arteries and central artery divisions to indicate pulmonary embolus.  Small bilateral pleural effusions are identified.  A few lymph nodes measuring up to 1 cm are seen within the mediastinum.  No axillary lymphadenopathy is identified.  The major airways are grossly patent.  Mild hazy ground-glass opacities evident at the lungs bilaterally.  There is patchy infiltrate and or atelectasis posterior lung bases.     1. No significant filling defects noted to the pulmonary arteries indicate pulmonary embolus 2. Cardiomegaly 3. Small bilateral pleural effusion with associated patchy infiltrate and or atelectasis lung bases 4. Hazy ground-glass opacities throughout the lungs bilaterally which may represent small airway disease.  A atypical pneumonia cannot be excluded.  Clinical correlation is indicated 5. Findings and other details as above Electronically signed by: Felipe Kincaid Date:    07/25/2025 Time:    13:12    X-Ray Chest AP Portable  Result Date: 7/25/2025  EXAMINATION: XR CHEST AP PORTABLE CLINICAL HISTORY: , Other chest pain. COMPARISON: 07/10/2025 FINDINGS: An AP view or more reveals the heart to be mildly enlarged.  The trachea is to the right of midline.  Mild hazy interstitial opacities evident lower lungs bilaterally.  No consolidative infiltrate or effusion is seen.  Degenerative changes are noted to the thoracic spine.     1. Mild cardiomegaly 2. Hazy interstitial opacities lower lungs bilaterally suggesting a chronic process.  A superimposed acute component cannot be entirely excluded.  Clinical correlation is indicated 3. Thoracic spondylosis Electronically signed by: Felipe Kincaid Date:    07/25/2025 Time:    11:24        ASSESSMENT/PLAN:       Plan:   Patient Active Problem List    Diagnosis Date Noted    Abdominal pain 07/26/2025    Acute on chronic congestive heart failure 07/25/2025    Pneumonia 07/25/2025    Atypical chest pain 07/25/2025      Shortness of breath multifactorial, a pneumonic process may be at play versus congestive heart failure.  Echo pending.  Patient on Zithromax and ceftriaxone to cover him for pneumonic process.      He is on pantoprazole for any GI process that may be going on such as GERD that might be causing him to have this cough and also in the referred pain.    Blood pressure is at goal.  GERD being treated with pantoprazole   History of BPH with hypoplasia.  With LUTS.  On Flomax.  History of reactive airway.  On Symbicort.  Tachycardia pulse rate lowering to 108.  We will monitor may need to increase his Coreg.  Abdominal pain right upper quadrant.  Unable to get an ultrasound we will do a CT.    7/27  Ct  showed LLL pneumonia   He is on   Zithromax and Rocephin.  His continuing to have abdominal pain.  He had to get a wide upper quadrant ultrasound.  CT did show stones and biliary sludge in his gallbladder.  Continue furosemide.  Overall he looks better.  He is breathing without oxygen and maintaining his O2 sats labs are stable.

## 2025-07-28 LAB
AV PEAK GRADIENT: 3 MMHG
AV REGURGITATION PRESSURE HALF TIME: 424 MS
AV VALVE AREA BY VELOCITY RATIO: 3.1 CM²
AV VELOCITY RATIO: 1
BSA FOR ECHO PROCEDURE: 2.04 M2
CV ECHO LV RWT: 0.48 CM
DOP CALC AO PEAK VEL: 0.9 M/S
DOP CALC LVOT AREA: 3.1 CM2
DOP CALC LVOT DIAMETER: 2 CM
DOP CALC LVOT PEAK VEL: 0.9 M/S
ECHO LV POSTERIOR WALL: 1.4 CM (ref 0.6–1.1)
FRACTIONAL SHORTENING: 12.1 % (ref 28–44)
INTERVENTRICULAR SEPTUM: 1.2 CM (ref 0.6–1.1)
LEFT ATRIUM SIZE: 0 CM
LEFT INTERNAL DIMENSION IN SYSTOLE: 5.1 CM (ref 2.1–4)
LEFT VENTRICLE DIASTOLIC VOLUME INDEX: 82.32 ML/M2
LEFT VENTRICLE DIASTOLIC VOLUME: 163 ML
LEFT VENTRICLE MASS INDEX: 167.4 G/M2
LEFT VENTRICLE SYSTOLIC VOLUME INDEX: 61.1 ML/M2
LEFT VENTRICLE SYSTOLIC VOLUME: 121 ML
LEFT VENTRICULAR INTERNAL DIMENSION IN DIASTOLE: 5.8 CM (ref 3.5–6)
LEFT VENTRICULAR MASS: 331.4 G
LVED V (TEICH): 163.35 ML
LVES V (TEICH): 121.15 ML
OHS CV RV/LV RATIO: 0.43 CM
PISA AR MAX VEL: 2.99 M/S
PISA MRMAX VEL: 4.83 M/S
PISA TR MAX VEL: 1.7 M/S
PV PEAK GRADIENT: 1 MMHG
PV PEAK VELOCITY: 0.52 M/S
RIGHT VENTRICLE DIASTOLIC BASEL DIMENSION: 2.5 CM
RIGHT VENTRICULAR END-DIASTOLIC DIMENSION: 2.53 CM
TR MAX PG: 12 MMHG
Z-SCORE OF LEFT VENTRICULAR DIMENSION IN END DIASTOLE: 0.15
Z-SCORE OF LEFT VENTRICULAR DIMENSION IN END SYSTOLE: 2.95

## 2025-07-28 PROCEDURE — 21400001 HC TELEMETRY ROOM

## 2025-07-28 PROCEDURE — 25000242 PHARM REV CODE 250 ALT 637 W/ HCPCS: Performed by: INTERNAL MEDICINE

## 2025-07-28 PROCEDURE — 94761 N-INVAS EAR/PLS OXIMETRY MLT: CPT

## 2025-07-28 PROCEDURE — 63600175 PHARM REV CODE 636 W HCPCS: Performed by: INTERNAL MEDICINE

## 2025-07-28 PROCEDURE — 25000003 PHARM REV CODE 250: Performed by: FAMILY MEDICINE

## 2025-07-28 PROCEDURE — 25000003 PHARM REV CODE 250: Performed by: INTERNAL MEDICINE

## 2025-07-28 PROCEDURE — 94640 AIRWAY INHALATION TREATMENT: CPT

## 2025-07-28 PROCEDURE — 25000242 PHARM REV CODE 250 ALT 637 W/ HCPCS: Performed by: FAMILY MEDICINE

## 2025-07-28 RX ORDER — AZITHROMYCIN 250 MG/1
500 TABLET, FILM COATED ORAL DAILY
Status: DISCONTINUED | OUTPATIENT
Start: 2025-07-29 | End: 2025-07-31 | Stop reason: HOSPADM

## 2025-07-28 RX ADMIN — LEVALBUTEROL HYDROCHLORIDE 1.25 MG: 1.25 SOLUTION RESPIRATORY (INHALATION) at 03:07

## 2025-07-28 RX ADMIN — PANTOPRAZOLE SODIUM 40 MG: 40 TABLET, DELAYED RELEASE ORAL at 11:07

## 2025-07-28 RX ADMIN — POTASSIUM CHLORIDE 20 MEQ: 1500 TABLET, EXTENDED RELEASE ORAL at 11:07

## 2025-07-28 RX ADMIN — ENOXAPARIN SODIUM 40 MG: 40 INJECTION SUBCUTANEOUS at 05:07

## 2025-07-28 RX ADMIN — LEVALBUTEROL HYDROCHLORIDE 1.25 MG: 1.25 SOLUTION RESPIRATORY (INHALATION) at 12:07

## 2025-07-28 RX ADMIN — MUPIROCIN 1 G: 20 OINTMENT TOPICAL at 09:07

## 2025-07-28 RX ADMIN — CEFTRIAXONE SODIUM 1 G: 1 INJECTION, POWDER, FOR SOLUTION INTRAMUSCULAR; INTRAVENOUS at 11:07

## 2025-07-28 RX ADMIN — FUROSEMIDE 20 MG: 20 TABLET ORAL at 05:07

## 2025-07-28 RX ADMIN — BUDESONIDE INHALATION 0.5 MG: 0.5 SUSPENSION RESPIRATORY (INHALATION) at 07:07

## 2025-07-28 RX ADMIN — VALSARTAN 160 MG: 160 TABLET, FILM COATED ORAL at 11:07

## 2025-07-28 RX ADMIN — FUROSEMIDE 20 MG: 20 TABLET ORAL at 11:07

## 2025-07-28 RX ADMIN — MUPIROCIN 1 G: 20 OINTMENT TOPICAL at 11:07

## 2025-07-28 RX ADMIN — FLUTICASONE FUROATE AND VILANTEROL TRIFENATATE 1 PUFF: 100; 25 POWDER RESPIRATORY (INHALATION) at 11:07

## 2025-07-28 RX ADMIN — CARVEDILOL 3.12 MG: 3.12 TABLET, FILM COATED ORAL at 11:07

## 2025-07-28 RX ADMIN — CARVEDILOL 3.12 MG: 3.12 TABLET, FILM COATED ORAL at 09:07

## 2025-07-28 RX ADMIN — FINASTERIDE 5 MG: 5 TABLET, FILM COATED ORAL at 11:07

## 2025-07-28 RX ADMIN — TAMSULOSIN HYDROCHLORIDE 0.4 MG: 0.4 CAPSULE ORAL at 09:07

## 2025-07-28 RX ADMIN — AZITHROMYCIN MONOHYDRATE 500 MG: 500 INJECTION, POWDER, LYOPHILIZED, FOR SOLUTION INTRAVENOUS at 11:07

## 2025-07-28 RX ADMIN — LEVALBUTEROL HYDROCHLORIDE 1.25 MG: 1.25 SOLUTION RESPIRATORY (INHALATION) at 07:07

## 2025-07-28 RX ADMIN — ATORVASTATIN CALCIUM 20 MG: 20 TABLET, FILM COATED ORAL at 11:07

## 2025-07-28 NOTE — PROGRESS NOTES
Inpatient Nutrition Assessment    Admit Date: 7/25/2025   Total duration of encounter: 3 days   Patient Age: 69 y.o.    Nutrition Recommendation/Prescription     Diet NPO.  Rec'd adv diet once medically feasible. Goal diet: Low Sodium (2 gm). Fluids per MD.  Once diet advanced, assess need for ONS.  Daily wts.  Monitor electrolytes and replete as medically feasible.  RD to monitor wt, labs, and po intake.    Communication of Recommendations: EMR    Nutrition Assessment     Malnutrition Assessment/Nutrition-Focused Physical Exam  Unable to assess at this time. Will attempt upon f/u.                                                              A minimum of two characteristics is recommended for diagnosis of either severe or non-severe malnutrition.    Chart Review    Reason Seen: continuous nutrition monitoring    Malnutrition Screening Tool Results   Have you recently lost weight without trying?: No  Have you been eating poorly because of a decreased appetite?: No   MST Score: 0   Diagnosis:   Pneumonia 7/25/2025      Acute on chronic congestive heart failure 7/25/2025      Atypical chest pain 7/25/2025      Abdominal pain 7/26/2025       Relevant Medical History:    BPH (benign prostatic hyperplasia)      GERD (gastroesophageal reflux disease)      Hypertension      Mixed hyperlipidemia        Scheduled Medications:  atorvastatin, 20 mg, Daily  azithromycin, 500 mg, Q24H  budesonide, 0.5 mg, Q12H  carvediloL, 3.125 mg, BID  cefTRIAXone (Rocephin) IV (PEDS and ADULTS), 1 g, Q24H  enoxparin, 40 mg, Q24H (prophylaxis, 1700)  finasteride, 5 mg, Daily  fluticasone furoate-vilanteroL, 1 puff, Daily  furosemide, 20 mg, BID  levalbuterol, 1.25 mg, Q8H  mupirocin, , BID  pantoprazole, 40 mg, Daily  potassium chloride, 20 mEq, Daily  tamsulosin, 0.4 mg, QHS  valsartan, 160 mg, Daily    Continuous Infusions:   PRN Medications:  melatonin, 6 mg, Nightly PRN  sodium chloride 0.9%, 10 mL, PRN    Calorie Containing IV Medications:  "no significant kcals from medications at this time    Recent Labs   Lab 07/25/25  0920 07/26/25  0340 07/27/25  0312    144 141   K 3.5 3.7 3.4*   CALCIUM 9.1 8.9 8.6*   PHOS  --  4.2 3.6   MG  --  2.10 2.00   * 111* 109*   CO2 26 25 22*   BUN 11.0 12.0 14.0   CREATININE 1.30* 1.28* 1.18   EGFRNORACEVR 60 >60 >60    107 103   BILITOT 0.9  --  0.7   ALKPHOS 55  --  59   ALT 28  --  26   AST 23  --  23   ALBUMIN 3.6  --  3.5   LIPASE 14  --   --    WBC 7.41 7.94 11.42   HGB 13.1* 12.5* 13.3*   HCT 40.0* 38.2* 40.4*     Nutrition Orders:  Diet NPO      Appetite/Oral Intake: NPO/NPO  Factors Affecting Nutritional Intake: NPO  Social Needs Impacting Access to Food: unable to assess at this time; will attempt on follow-up  Food/Zoroastrianism/Cultural Preferences: unable to obtain  Food Allergies: no known food allergies  Last Bowel Movement: 07/26/25  Wound(s):  skin intact    Comments    7/28/25: Pt NPO at this time. Previously on Low Sodium diet, consuming % of meals. Rec'd resume diet once medically appropriate. Unable to perform NFPE today, will attempt upon f/u. Wt hx limited. Pt weighing 220# 5 years ago, #. Per MST: no wt loss or decreased po intake reported. Labs/meds reviewed. Will assess need for ONS once diet advanced.    Anthropometrics    Height: 5' 5" (165.1 cm), Height Method: Stated  Last Weight: 90.8 kg (200 lb 2.8 oz) (07/25/25 1534), Weight Method: Bed Scale  BMI (Calculated): 33.3  BMI Classification: obese grade I (BMI 30-34.9)        Ideal Body Weight (IBW), Male: 136 lb     % Ideal Body Weight, Male (lb): 147.19 %                          Usual Weight Provided By: unable to obtain usual weight    Wt Readings from Last 5 Encounters:   07/25/25 90.8 kg (200 lb 2.8 oz)   07/10/25 91.6 kg (202 lb)     Weight Change(s) Since Admission:   7/28: 90.8 kg  Wt Readings from Last 1 Encounters:   07/25/25 1534 90.8 kg (200 lb 2.8 oz)   07/25/25 0916 91.6 kg (202 lb)   Admit Weight: " 91.6 kg (202 lb) (07/25/25 0916), Weight Method: Stated    Estimated Needs    Weight Used For Calorie Calculations: 90.8 kg (200 lb 2.8 oz)  Energy Calorie Requirements (kcal): 4001-3248 kcal (1.1-1.2 SFxMSJ)  Energy Need Method: [place]-St Jeor  Weight Used For Protein Calculations: 90.8 kg (200 lb 2.8 oz)  Protein Requirements: 100-118 g (1.1-1.3 g/kg)  Fluid Requirements (mL): 2270 mL/d (25 mL/kg)        Enteral Nutrition     Patient not receiving enteral nutrition at this time.    Parenteral Nutrition     Patient not receiving parenteral nutrition support at this time.    Evaluation of Received Nutrient Intake    Calories: not meeting estimated needs  Protein: not meeting estimated needs    Patient Education     Not applicable.    Nutrition Diagnosis     PES: Inadequate oral intake related to inability to consume sufficient nutrients as evidenced by pt NPO. (new)     PES:            Nutrition Interventions     Intervention(s): Sodium modified diet and Collaboration and referral of nutrition care  Intervention(s):      Goal: Maintain weight throughout hospitalization. (new)  Goal: Meet greater than 80% of nutritional needs by follow-up. (new)    Nutrition Goals & Monitoring     Dietitian will monitor: energy intake, weight, electrolyte/renal panel, glucose/endocrine profile, and gastrointestinal profile  Discharge planning: too early to determine; pending clinical course  Nutrition Risk/Follow-Up: dietitian will follow-up one time per week   Please consult if re-assessment needed sooner.

## 2025-07-28 NOTE — PROGRESS NOTES
[facility]                     [address] [address]    PATIENT NAME:       KWASI HUTCHINSON  YOB: 1955  CSN:                [id number]   MRN:                [id number]  ADMIT DATE:         07/25/2025 09:19:00  PHYSICIAN:          Erich Borja MD                            PROGRESS NOTE    DATE:      CODE STATUS:  Full code.    SUBJECTIVE:  The patient was visited in the room and he is coughing, but much   less.  His shortness of breath is also much less and he feels better than when   he came in.  The patient was found to have small airway disease, congestive   heart failure, as well as possible pneumonitis.  He was placed on antibiotics,   small volume nebulizers and gentle hydration along with IV antibiotics.  Echo   results were awaited and it was done today.  The ejection fraction is severely   reduced to 25%-30% and there is mild-to-moderate regurgitation, though a report   has not been signed, but that explains the patient's exertional shortness of   breath and we will start the patient on the regime for reduced ejection   fraction, heart failure as this is a new onset.  His only risk factor is   hypertension.    OBJECTIVE:  VITAL SIGNS:  The patient's vitals are as follows; 135/91 blood   pressure, 98.2 temperature, 95% O2 sat, 98 pulse.  HEENT:  Head is normocephalic.  Pupils bilaterally reactive, equal in size.    Mild conjunctival pallor.  No scleral icterus.  Trachea is in midline.  CHEST:  Good bilateral air entry.  CVS:  First and second heart sounds are heard normally without any added   murmurs.  ABDOMEN:  Soft, nontender.  EXTREMITIES:  Devoid of any edema, cyanosis, or clubbing.    LABS AND INVESTIGATIONS:  Unofficial report of echocardiogram, ejection fraction   25%-30%.  WBC 11.42, hemoglobin 13.3, hematocrit 40.4, platelet count adequate.    Sodium 141, potassium 3.5, chloride 109,  bicarb 22.  Ultrasound of the liver   shows cholelithiasis without gallbladder thickening, suboptimal visualization of   the pancreas, mild hepatomegaly with suspected steatosis.    IMPRESSION:    1. Exertional shortness of breath secondary to reduced ejection fraction,   congestive heart failure.  We will place a Cardiology consult.  2. Left lower lobe pneumonia, on appropriate antibiotics.  3. Cholelithiasis.  4. History of hypertension.  5. History of benign prostatic hyperplasia with lower urinary tract symptoms.  6. History of dyslipidemia.    PLAN:  Continue with the present line of treatment.  I am going to discontinue   the valsartan and going to change it to low-dose Entresto.  Follow with the   potassium and BUN and creatinine.  The patient is already on Coreg and Lasix and   we will see what other medications can be given and we will place a Cardiology   consult.        ______________________________  MD JIM Thompson/JAIRON  DD:  07/28/2025  Time:  01:47PM  DT:  07/28/2025  Time:  03:31PM  Job #:  [id number]      PROGRESS NOTE

## 2025-07-28 NOTE — PROGRESS NOTES
Pharmacist Intervention IV to PO Note    Ruben Frey is a 69 y.o. male being treated with IV medication azithromycin    Patient Data:    Vital Signs (Most Recent):  Temp: 98.2 °F (36.8 °C) (07/28/25 1124)  Pulse: 98 (07/28/25 1156)  Resp: 18 (07/28/25 1156)  BP: (!) 135/91 (07/28/25 1124)  SpO2: 95 % (07/28/25 1124) Vital Signs (72h Range):  Temp:  [97.5 °F (36.4 °C)-99 °F (37.2 °C)]   Pulse:  []   Resp:  [12-43]   BP: (110-154)/(75-97)   SpO2:  [86 %-98 %]      CBC:  Recent Labs   Lab 07/25/25  0920 07/26/25  0340 07/27/25  0312   WBC 7.41 7.94 11.42   RBC 4.61* 4.40* 4.67*   HGB 13.1* 12.5* 13.3*   HCT 40.0* 38.2* 40.4*    183 180   MCV 86.8 86.8 86.5   MCH 28.4 28.4 28.5   MCHC 32.8* 32.7* 32.9*     CMP:     Recent Labs   Lab 07/25/25  0920 07/26/25  0340 07/27/25  0312    107 103   CALCIUM 9.1 8.9 8.6*   ALBUMIN 3.6  --  3.5   PROT 7.4  --  7.4    144 141   K 3.5 3.7 3.4*   CO2 26 25 22*   * 111* 109*   BUN 11.0 12.0 14.0   CREATININE 1.30* 1.28* 1.18   ALKPHOS 55  --  59   ALT 28  --  26   AST 23  --  23   BILITOT 0.9  --  0.7       Dietary Orders:  Diet Orders            Diet Low Sodium (2 gm) Standard Tray: Low Sodium, 2gm starting at 07/28 1145            Based on the following criteria, this patient qualifies for intravenous to oral conversion:  [x] The patients gastrointestinal tract is functioning (tolerating medications via oral or enteral route for 24 hours and tolerating food or enteral feeds for 24 hours).  [x] The patient is hemodynamically stable for 24 hours (heart rate <100 beats per minute, systolic blood pressure >99 mm Hg, and respiratory rate <20 breaths per minute).  [x] The patient shows clinical improvement (afebrile for at least 24 hours and white blood cell count downtrending or normalized). Additionally, the patient must be non-neutropenic (absolute neutrophil count >500 cells/mm3).  [x] For antimicrobials, the patient has received IV therapy for at  least 24 hours.    IV medication azithromycin will be changed to oral medication azithromycin    Pharmacist's Name: Alfie James  Pharmacist's Extension: [phone]

## 2025-07-29 LAB
ANION GAP SERPL CALC-SCNC: 8 MEQ/L
BASOPHILS # BLD AUTO: 0.08 X10(3)/MCL
BASOPHILS NFR BLD AUTO: 0.8 %
BUN SERPL-MCNC: 18 MG/DL (ref 8.4–25.7)
CALCIUM SERPL-MCNC: 8.7 MG/DL (ref 8.8–10)
CHLORIDE SERPL-SCNC: 109 MMOL/L (ref 98–107)
CO2 SERPL-SCNC: 26 MMOL/L (ref 23–31)
CREAT SERPL-MCNC: 1.48 MG/DL (ref 0.72–1.25)
CREAT/UREA NIT SERPL: 12
EOSINOPHIL # BLD AUTO: 0.26 X10(3)/MCL (ref 0–0.9)
EOSINOPHIL NFR BLD AUTO: 2.6 %
ERYTHROCYTE [DISTWIDTH] IN BLOOD BY AUTOMATED COUNT: 13.4 % (ref 11.5–17)
GFR SERPLBLD CREATININE-BSD FMLA CKD-EPI: 51 ML/MIN/1.73/M2
GLUCOSE SERPL-MCNC: 110 MG/DL (ref 82–115)
HCT VFR BLD AUTO: 38.4 % (ref 42–52)
HGB BLD-MCNC: 12.5 G/DL (ref 14–18)
IMM GRANULOCYTES # BLD AUTO: 0.04 X10(3)/MCL (ref 0–0.04)
IMM GRANULOCYTES NFR BLD AUTO: 0.4 %
LYMPHOCYTES # BLD AUTO: 2.11 X10(3)/MCL (ref 0.6–4.6)
LYMPHOCYTES NFR BLD AUTO: 21.5 %
MAGNESIUM SERPL-MCNC: 2 MG/DL (ref 1.6–2.6)
MCH RBC QN AUTO: 28.3 PG (ref 27–31)
MCHC RBC AUTO-ENTMCNC: 32.6 G/DL (ref 33–36)
MCV RBC AUTO: 87.1 FL (ref 80–94)
MONOCYTES # BLD AUTO: 0.71 X10(3)/MCL (ref 0.1–1.3)
MONOCYTES NFR BLD AUTO: 7.2 %
NEUTROPHILS # BLD AUTO: 6.63 X10(3)/MCL (ref 2.1–9.2)
NEUTROPHILS NFR BLD AUTO: 67.5 %
NRBC BLD AUTO-RTO: 0 %
PHOSPHATE SERPL-MCNC: 3.7 MG/DL (ref 2.3–4.7)
PLATELET # BLD AUTO: 179 X10(3)/MCL (ref 130–400)
PMV BLD AUTO: 10.3 FL (ref 7.4–10.4)
POTASSIUM SERPL-SCNC: 3.9 MMOL/L (ref 3.5–5.1)
RBC # BLD AUTO: 4.41 X10(6)/MCL (ref 4.7–6.1)
SODIUM SERPL-SCNC: 143 MMOL/L (ref 136–145)
WBC # BLD AUTO: 9.83 X10(3)/MCL (ref 4.5–11.5)

## 2025-07-29 PROCEDURE — 25000003 PHARM REV CODE 250: Performed by: INTERNAL MEDICINE

## 2025-07-29 PROCEDURE — 84100 ASSAY OF PHOSPHORUS: CPT | Performed by: INTERNAL MEDICINE

## 2025-07-29 PROCEDURE — 94761 N-INVAS EAR/PLS OXIMETRY MLT: CPT

## 2025-07-29 PROCEDURE — 85025 COMPLETE CBC W/AUTO DIFF WBC: CPT | Performed by: INTERNAL MEDICINE

## 2025-07-29 PROCEDURE — 94640 AIRWAY INHALATION TREATMENT: CPT

## 2025-07-29 PROCEDURE — 25000242 PHARM REV CODE 250 ALT 637 W/ HCPCS: Performed by: INTERNAL MEDICINE

## 2025-07-29 PROCEDURE — 21400001 HC TELEMETRY ROOM

## 2025-07-29 PROCEDURE — 36415 COLL VENOUS BLD VENIPUNCTURE: CPT | Performed by: INTERNAL MEDICINE

## 2025-07-29 PROCEDURE — 63600175 PHARM REV CODE 636 W HCPCS: Performed by: INTERNAL MEDICINE

## 2025-07-29 PROCEDURE — 25000242 PHARM REV CODE 250 ALT 637 W/ HCPCS: Performed by: FAMILY MEDICINE

## 2025-07-29 PROCEDURE — 80048 BASIC METABOLIC PNL TOTAL CA: CPT | Performed by: INTERNAL MEDICINE

## 2025-07-29 PROCEDURE — 63700000 PHARM REV CODE 250 ALT 637 W/O HCPCS: Performed by: INTERNAL MEDICINE

## 2025-07-29 PROCEDURE — 83735 ASSAY OF MAGNESIUM: CPT | Performed by: INTERNAL MEDICINE

## 2025-07-29 RX ORDER — DAPAGLIFLOZIN 10 MG/1
10 TABLET, FILM COATED ORAL DAILY
Status: DISCONTINUED | OUTPATIENT
Start: 2025-07-29 | End: 2025-07-31 | Stop reason: HOSPADM

## 2025-07-29 RX ORDER — FUROSEMIDE 20 MG/1
20 TABLET ORAL DAILY
Status: DISCONTINUED | OUTPATIENT
Start: 2025-07-30 | End: 2025-07-31 | Stop reason: HOSPADM

## 2025-07-29 RX ORDER — CARVEDILOL 6.25 MG/1
6.25 TABLET ORAL 2 TIMES DAILY
Status: DISCONTINUED | OUTPATIENT
Start: 2025-07-29 | End: 2025-07-30

## 2025-07-29 RX ADMIN — FLUTICASONE FUROATE AND VILANTEROL TRIFENATATE 1 PUFF: 100; 25 POWDER RESPIRATORY (INHALATION) at 02:07

## 2025-07-29 RX ADMIN — MUPIROCIN 1 G: 20 OINTMENT TOPICAL at 08:07

## 2025-07-29 RX ADMIN — CARVEDILOL 6.25 MG: 6.25 TABLET, FILM COATED ORAL at 08:07

## 2025-07-29 RX ADMIN — SACUBITRIL AND VALSARTAN 1 TABLET: 24; 26 TABLET, FILM COATED ORAL at 09:07

## 2025-07-29 RX ADMIN — LEVALBUTEROL HYDROCHLORIDE 1.25 MG: 1.25 SOLUTION RESPIRATORY (INHALATION) at 03:07

## 2025-07-29 RX ADMIN — CEFTRIAXONE SODIUM 1 G: 1 INJECTION, POWDER, FOR SOLUTION INTRAMUSCULAR; INTRAVENOUS at 10:07

## 2025-07-29 RX ADMIN — FINASTERIDE 5 MG: 5 TABLET, FILM COATED ORAL at 08:07

## 2025-07-29 RX ADMIN — FUROSEMIDE 20 MG: 20 TABLET ORAL at 08:07

## 2025-07-29 RX ADMIN — MUPIROCIN 1 G: 20 OINTMENT TOPICAL at 09:07

## 2025-07-29 RX ADMIN — BUDESONIDE INHALATION 0.5 MG: 0.5 SUSPENSION RESPIRATORY (INHALATION) at 07:07

## 2025-07-29 RX ADMIN — LEVALBUTEROL HYDROCHLORIDE 1.25 MG: 1.25 SOLUTION RESPIRATORY (INHALATION) at 11:07

## 2025-07-29 RX ADMIN — SACUBITRIL AND VALSARTAN 1 TABLET: 24; 26 TABLET, FILM COATED ORAL at 08:07

## 2025-07-29 RX ADMIN — PANTOPRAZOLE SODIUM 40 MG: 40 TABLET, DELAYED RELEASE ORAL at 08:07

## 2025-07-29 RX ADMIN — CARVEDILOL 6.25 MG: 6.25 TABLET, FILM COATED ORAL at 09:07

## 2025-07-29 RX ADMIN — LEVALBUTEROL HYDROCHLORIDE 1.25 MG: 1.25 SOLUTION RESPIRATORY (INHALATION) at 07:07

## 2025-07-29 RX ADMIN — DAPAGLIFLOZIN 10 MG: 10 TABLET, FILM COATED ORAL at 08:07

## 2025-07-29 RX ADMIN — ATORVASTATIN CALCIUM 20 MG: 20 TABLET, FILM COATED ORAL at 08:07

## 2025-07-29 RX ADMIN — AZITHROMYCIN DIHYDRATE 500 MG: 250 TABLET ORAL at 08:07

## 2025-07-29 RX ADMIN — TAMSULOSIN HYDROCHLORIDE 0.4 MG: 0.4 CAPSULE ORAL at 09:07

## 2025-07-29 RX ADMIN — ENOXAPARIN SODIUM 40 MG: 40 INJECTION SUBCUTANEOUS at 05:07

## 2025-07-29 RX ADMIN — LEVALBUTEROL HYDROCHLORIDE 1.25 MG: 1.25 SOLUTION RESPIRATORY (INHALATION) at 12:07

## 2025-07-29 NOTE — PROGRESS NOTES
[facility]                     [address] [address]    PATIENT NAME:       KWASI HUTCHINSON  YOB: 1955  CSN:                [id number]   MRN:                [id number]  ADMIT DATE:         07/25/2025 09:19:00  PHYSICIAN:          Erich Borja MD                            PROGRESS NOTE    DATE:      SUBJECTIVE:  The patient was visited in the room and he is looking much better.    His shortness of breath is less.  He can lie down a little bit better now on   his back, so orthopnea is better.  He was diagnosed with systolic dysfunction   most likely secondary to cardiomyopathy, ischemic or nonischemic we do not know.    He will require workup as an outpatient.  [facility] has seen the patient today.    They would like to see the patient in the clinic and provide him with the   LifeVest as well as do an angiogram to find out the pathology of his   cardiomyopathy as this was very recent and sudden.  The patient did not have any   upper respiratory tract infection.  He was also admitted for pneumonia and   small airway disease for which he has been on IV antibiotics.    OBJECTIVE:  VITAL SIGNS:  The patient's vitals are as follows; blood pressure   149/95, pulse 98, O2 sat 97%.  HEENT:  Head is normocephalic.  Pupils bilaterally reactive and equal in size.    Mild conjunctival pallor.  No scleral icterus.  Trachea is in midline.  CHEST:  Good bilateral air entry.  CVS:  First and second heart sounds are heard normally without any murmurs.  ABDOMEN:  Soft, nontender.  EXTREMITIES:  Devoid of edema, cyanosis, or clubbing.    LABS AND INVESTIGATIONS:  White cell count 9.8, hemoglobin 12.5, hematocrit   38.4, MCV 87.1, platelet count adequate.  Sodium 143, potassium 3.9, chloride   109, BUN 18, creatinine 1.48, GFR of 51, magnesium 2.0.    IMPRESSION:    1. Shortness of breath, multifactorial, but mainly systolic  dysfunction with   ejection fraction of 25%, along with small airway disease on the CAT scan as   well as pulmonary infiltrates, possible pneumonitis, on IV antibiotics.  2. History of obesity.  3. History of hypertension.  4. History of benign prostatic hyperplasia with lower urinary tract symptoms.  5. History of dyslipidemia.    PLAN:  Continue the present line of treatment.  Follow the recommendations of   Cardiology.  Spironolactone is to be added.  The patient is still tachycardic.    We will wait for any and increase the dose of the beta-blocker which is the   Coreg.  Continue with Entresto, Farxiga, and Coreg.  Up titration of the   medication as dictated by the BUN, creatinine, and patient's blood pressure.    DVT prophylaxis with Lovenox.  GI prophylaxis with proton pump inhibitors.    Pleasure taking care of . Ruben Frey during his stay at [facility] on the 3rd floor.        ______________________________  MD JIM Thompson/JAIRON  DD:  07/29/2025  Time:  02:15PM  DT:  07/29/2025  Time:  05:24PM  Job #:  [id number]      PROGRESS NOTE

## 2025-07-29 NOTE — CONSULTS
[facility] - Medical Surgical Unit  Cardiology  Consult Note    Patient Name: Ruben Frey  MRN: [id number]  Admission Date: 7/25/2025  Hospital Length of Stay: 4 days  Code Status: Full Code   Attending Provider: Erich Borja MD   Consulting Provider: GEORGES Lux  Primary Care Physician: Erich Borja MD  Principal Problem:Pneumonia    Patient information was obtained from patient and primary team.     Inpatient consult to Cardiology  Consult performed by: German Turk FNP  Consult ordered by: Erich Borja MD  Reason for consult: Cardiomyopathy        Subjective:     Chief Complaint:  SOB     HPI: Patient is a 68 yo male unknown to [facility]. He presented to ED with complaints of SOB. He was admitted for treatment of pneumonia and further workup. He states he is feeling much better. He had an Echo done which showed a diminished EF. He is currently resting in bed without distress. He currently denies any CP or SOB.     Past Medical History:   Diagnosis Date    BPH (benign prostatic hyperplasia)     GERD (gastroesophageal reflux disease)     Hypertension     Mixed hyperlipidemia        History reviewed. No pertinent surgical history.    Review of patient's allergies indicates:  No Known Allergies    No current facility-administered medications on file prior to encounter.     Current Outpatient Medications on File Prior to Encounter   Medication Sig    amlodipine-valsartan (EXFORGE)  mg per tablet Take 1 tablet by mouth once daily.    atorvastatin (LIPITOR) 20 MG tablet Take 20 mg by mouth once daily.    finasteride (PROSCAR) 5 mg tablet Take 5 mg by mouth once daily.    SYMBICORT 80-4.5 mcg/actuation HFAA Inhale 2 puffs into the lungs.    furosemide (LASIX) 20 MG tablet Take 1 tablet (20 mg total) by mouth once daily. for 5 days     Family History    None       Tobacco Use    Smoking status: Never    Smokeless tobacco: Never   Vaping Use    Vaping status: Never Used    Substance and Sexual Activity    Alcohol use: Not Currently    Drug use: Not Currently    Sexual activity: Not on file     Review of Systems   Constitutional: Negative.   Cardiovascular: Negative.    Respiratory:  Positive for shortness of breath.      Objective:     Vital Signs (Most Recent):  Temp: 99.3 °F (37.4 °C) (07/29/25 0755)  Pulse: 101 (07/29/25 0859)  Resp: 16 (07/29/25 0718)  BP: 137/88 (07/29/25 0859)  SpO2: (!) 94 % (07/29/25 0755) Vital Signs (24h Range):  Temp:  [97.5 °F (36.4 °C)-99.3 °F (37.4 °C)] 99.3 °F (37.4 °C)  Pulse:  [] 101  Resp:  [16-20] 16  SpO2:  [91 %-96 %] 94 %  BP: (113-144)/(78-91) 137/88     Weight: 90.8 kg (200 lb 2.8 oz)  Body mass index is 33.31 kg/m².    SpO2: (!) 94 %         Intake/Output Summary (Last 24 hours) at 7/29/2025 1004  Last data filed at 7/29/2025 0800  Gross per 24 hour   Intake 1320 ml   Output 2 ml   Net 1318 ml       Lines/Drains/Airways       Peripheral Intravenous Line  Duration             Peripheral IV Single Lumen 07/25/25 0927 20 G Right Antecubital 4 days                    Physical Exam  Constitutional:       General: He is not in acute distress.  Eyes:      Extraocular Movements: Extraocular movements intact.   Cardiovascular:      Rate and Rhythm: Normal rate and regular rhythm.   Pulmonary:      Effort: Pulmonary effort is normal. No respiratory distress.      Breath sounds: Normal breath sounds.   Musculoskeletal:         General: No swelling. Normal range of motion.   Skin:     General: Skin is warm and dry.   Neurological:      Mental Status: He is alert and oriented to person, place, and time.   Psychiatric:         Mood and Affect: Mood normal.         Behavior: Behavior normal.         Significant Labs: CMP   Recent Labs   Lab 07/29/25 0255      K 3.9   *   CO2 26      BUN 18.0   CREATININE 1.48*   CALCIUM 8.7*    and CBC   Recent Labs   Lab 07/29/25 0255   WBC 9.83   HGB 12.5*   HCT 38.4*           Significant Imaging:   Echocardiogram 07/25/25:     Left Ventricle: The left ventricle is severely dilated. Mildly increased wall thickness. Severe global hypokinesis present. There is severely reduced systolic function with a visually estimated ejection fraction of 25 - 30%.    Right Ventricle: Systolic function is normal.    Left Atrium: The left atrium is moderately dilated.    Right Atrium: The right atrium is moderately dilated.    Aortic Valve: The aortic valve is a trileaflet valve. Mildly calcified cusps. There is mild to moderate aortic regurgitation.    Mitral Valve: There is moderate regurgitation.  Assessment and Plan:     Cardiomyopathy, dilated  Patient seems euvolemic on exam.  Decrease Lasix 20 mg daily  Continue Entresto, Farxiga and coreg. We will up-titrate meds as outpatient   Life vest ordered  We will plan for outpatient ischemic evaluation  Pneumonia  Abx per primary   HTN  HLD    Thank you for your consult.   Please call with any further questions.    German Turk, GEORGES  Cardiology   [facility] - [facility] Surgical Unit

## 2025-07-29 NOTE — CONSULTS
Inpatient Nutrition Assessment    Admit Date: 7/25/2025   Total duration of encounter: 4 days   Patient Age: 69 y.o.    Nutrition Recommendation/Prescription     Diet Low Sodium (2 gm) Standard Tray.  Daily wts.  Monitor lytes and replete as needed.  RD to monitor wt, labs, medications, and po intake.    Could not complete diet education today, will attempt to provide low sodium diet handouts at f/u. Thank you.    Communication of Recommendations: EMR    Nutrition Assessment     Malnutrition Assessment/Nutrition-Focused Physical Exam  Unable to complete NFPE at this time. Will attempt upon f/u.     Malnutrition Level: other (see comments) (Unable to assess) (07/29/25 0912)  Energy Intake (Malnutrition): other (see comments) (Does not meet criteria) (07/29/25 0912)  Weight Loss (Malnutrition): other (see comments) (Unable to assess) (07/29/25 0912)                                         Fluid Accumulation (Malnutrition): moderate (07/29/25 0912)     Hand  Strength, Right (Malnutrition): Unable to assess (07/29/25 0912)  A minimum of two characteristics is recommended for diagnosis of either severe or non-severe malnutrition.    Chart Review    Reason Seen: physician consult for new onset CHF and follow-up    Malnutrition Screening Tool Results   Have you recently lost weight without trying?: No  Have you been eating poorly because of a decreased appetite?: No   MST Score: 0   Diagnosis:   Pneumonia 7/25/2025      Acute on chronic congestive heart failure 7/25/2025      Atypical chest pain 7/25/2025      Abdominal pain 7/26/2025       Relevant Medical History:    BPH (benign prostatic hyperplasia)      GERD (gastroesophageal reflux disease)      Hypertension      Mixed hyperlipidemia        Scheduled Medications:  atorvastatin, 20 mg, Daily  azithromycin, 500 mg, Daily  budesonide, 0.5 mg, Q12H  carvediloL, 6.25 mg, BID  cefTRIAXone (Rocephin) IV (PEDS and ADULTS), 1 g, Q24H  dapagliflozin propanediol, 10 mg,  Daily  enoxparin, 40 mg, Q24H (prophylaxis, 1700)  finasteride, 5 mg, Daily  fluticasone furoate-vilanteroL, 1 puff, Daily  furosemide, 20 mg, BID  levalbuterol, 1.25 mg, Q8H  mupirocin, , BID  pantoprazole, 40 mg, Daily  sacubitriL-valsartan, 1 tablet, BID  tamsulosin, 0.4 mg, QHS    Continuous Infusions:   PRN Medications:  melatonin, 6 mg, Nightly PRN  sodium chloride 0.9%, 10 mL, PRN    Calorie Containing IV Medications: no significant kcals from medications at this time    Recent Labs   Lab 07/25/25  0920 07/26/25  0340 07/27/25  0312 07/29/25  0255    144 141 143   K 3.5 3.7 3.4* 3.9   CALCIUM 9.1 8.9 8.6* 8.7*   PHOS  --  4.2 3.6 3.7   MG  --  2.10 2.00 2.00   * 111* 109* 109*   CO2 26 25 22* 26   BUN 11.0 12.0 14.0 18.0   CREATININE 1.30* 1.28* 1.18 1.48*   EGFRNORACEVR 60 >60 >60 51    107 103 110   BILITOT 0.9  --  0.7  --    ALKPHOS 55  --  59  --    ALT 28  --  26  --    AST 23  --  23  --    ALBUMIN 3.6  --  3.5  --    LIPASE 14  --   --   --    WBC 7.41 7.94 11.42 9.83   HGB 13.1* 12.5* 13.3* 12.5*   HCT 40.0* 38.2* 40.4* 38.4*     Nutrition Orders:  Diet Low Sodium (2 gm) Standard Tray      Appetite/Oral Intake: good/% of meals  Factors Affecting Nutritional Intake: none identified  Social Needs Impacting Access to Food: unable to assess at this time; will attempt on follow-up  Food/Mormonism/Cultural Preferences: unable to obtain  Food Allergies: no known food allergies  Last Bowel Movement: 07/27/25  Wound(s):  skin intact    Comments    7/28/25: Pt NPO at this time. Previously on Low Sodium diet, consuming % of meals. Rec'd resume diet once medically appropriate. Unable to perform NFPE today, will attempt upon f/u. Wt hx limited. Pt weighing 220# 5 years ago, #. Per MST: no wt loss or decreased po intake reported. Labs/meds reviewed. Will assess need for ONS once diet advanced.    7/29/25: Diet advanced to low sodium. Pt tolerating with good oral intake,  "consuming 100% of meals. SOB and coughing have improved per MD. RD consulted for new onset CHF, unable to provide diet education at this time, will attempt to provide low sodium/heart healthy education at f/u 7/31. No reported n/v/d/c. No new weights recorded. Labs/meds reviewed.    Anthropometrics    Height: 5' 5" (165.1 cm), Height Method: Stated  Last Weight: 90.8 kg (200 lb 2.8 oz) (07/25/25 1534), Weight Method: Bed Scale  BMI (Calculated): 33.3  BMI Classification: obese grade I (BMI 30-34.9)        Ideal Body Weight (IBW), Male: 136 lb     % Ideal Body Weight, Male (lb): 147.19 %                          Usual Weight Provided By: unable to obtain usual weight    Wt Readings from Last 5 Encounters:   07/25/25 90.8 kg (200 lb 2.8 oz)   07/10/25 91.6 kg (202 lb)     Weight Change(s) Since Admission:   7/28: 90.8 kg  7/29: no new wts  Wt Readings from Last 1 Encounters:   07/25/25 1534 90.8 kg (200 lb 2.8 oz)   07/25/25 0916 91.6 kg (202 lb)   Admit Weight: 91.6 kg (202 lb) (07/25/25 0916), Weight Method: Stated    Estimated Needs    Weight Used For Calorie Calculations: 90.8 kg (200 lb 2.8 oz)  Energy Calorie Requirements (kcal): 6492-7500 kcal (1.1-1.2 SFxMSJ)  Energy Need Method: [facility]  Weight Used For Protein Calculations: 90.8 kg (200 lb 2.8 oz)  Protein Requirements: 100-118 g (1.1-1.3 g/kg)  Fluid Requirements (mL): 2270 mL/d (25 mL/kg)        Enteral Nutrition     Patient not receiving enteral nutrition at this time.    Parenteral Nutrition     Patient not receiving parenteral nutrition support at this time.    Evaluation of Received Nutrient Intake    Calories: meeting estimated needs  Protein: meeting estimated needs    Patient Education     Not applicable.     Nutrition Diagnosis     PES: Inadequate oral intake related to inability to consume sufficient nutrients as evidenced by pt NPO. (resolved)     PES:            Nutrition Interventions     Intervention(s): Sodium modified diet, Content " related nutrition education, and Collaboration and referral of nutrition care  Intervention(s):      Goal: Maintain weight throughout hospitalization. (goal progressing)  Goal: Meet greater than 80% of nutritional needs by follow-up. (goal met)    Nutrition Goals & Monitoring     Dietitian will monitor: energy intake, weight, electrolyte/renal panel, glucose/endocrine profile, and gastrointestinal profile  Discharge planning: continue low sodium diet  Nutrition Risk/Follow-Up: dietitian will follow-up one time per week   Please consult if re-assessment needed sooner.

## 2025-07-30 PROBLEM — K80.20 GALL BLADDER STONES: Status: ACTIVE | Noted: 2025-07-30

## 2025-07-30 PROBLEM — I50.21 ACUTE SYSTOLIC HEART FAILURE: Status: ACTIVE | Noted: 2025-07-30

## 2025-07-30 PROBLEM — R06.02 SOB (SHORTNESS OF BREATH) ON EXERTION: Status: RESOLVED | Noted: 2025-07-30 | Resolved: 2025-07-30

## 2025-07-30 PROBLEM — R06.02 SOB (SHORTNESS OF BREATH) ON EXERTION: Status: ACTIVE | Noted: 2025-07-30

## 2025-07-30 PROBLEM — R07.89 ATYPICAL CHEST PAIN: Status: RESOLVED | Noted: 2025-07-25 | Resolved: 2025-07-30

## 2025-07-30 PROBLEM — R10.9 ABDOMINAL PAIN: Status: RESOLVED | Noted: 2025-07-26 | Resolved: 2025-07-30

## 2025-07-30 PROBLEM — J18.9 PNEUMONIA: Status: RESOLVED | Noted: 2025-07-25 | Resolved: 2025-07-30

## 2025-07-30 LAB
ANION GAP SERPL CALC-SCNC: 9 MEQ/L
BACTERIA BLD CULT: NORMAL
BACTERIA BLD CULT: NORMAL
BASOPHILS # BLD AUTO: 0.08 X10(3)/MCL
BASOPHILS NFR BLD AUTO: 0.8 %
BUN SERPL-MCNC: 16 MG/DL (ref 8.4–25.7)
CALCIUM SERPL-MCNC: 8.9 MG/DL (ref 8.8–10)
CHLORIDE SERPL-SCNC: 109 MMOL/L (ref 98–107)
CO2 SERPL-SCNC: 24 MMOL/L (ref 23–31)
CREAT SERPL-MCNC: 1.27 MG/DL (ref 0.72–1.25)
CREAT/UREA NIT SERPL: 13
EOSINOPHIL # BLD AUTO: 0.27 X10(3)/MCL (ref 0–0.9)
EOSINOPHIL NFR BLD AUTO: 2.8 %
ERYTHROCYTE [DISTWIDTH] IN BLOOD BY AUTOMATED COUNT: 13.3 % (ref 11.5–17)
GFR SERPLBLD CREATININE-BSD FMLA CKD-EPI: >60 ML/MIN/1.73/M2
GLUCOSE SERPL-MCNC: 110 MG/DL (ref 82–115)
HCT VFR BLD AUTO: 40.2 % (ref 42–52)
HGB BLD-MCNC: 13.4 G/DL (ref 14–18)
IMM GRANULOCYTES # BLD AUTO: 0.04 X10(3)/MCL (ref 0–0.04)
IMM GRANULOCYTES NFR BLD AUTO: 0.4 %
LYMPHOCYTES # BLD AUTO: 2.2 X10(3)/MCL (ref 0.6–4.6)
LYMPHOCYTES NFR BLD AUTO: 22.9 %
MAGNESIUM SERPL-MCNC: 2.1 MG/DL (ref 1.6–2.6)
MCH RBC QN AUTO: 28.6 PG (ref 27–31)
MCHC RBC AUTO-ENTMCNC: 33.3 G/DL (ref 33–36)
MCV RBC AUTO: 85.7 FL (ref 80–94)
MONOCYTES # BLD AUTO: 0.82 X10(3)/MCL (ref 0.1–1.3)
MONOCYTES NFR BLD AUTO: 8.5 %
NEUTROPHILS # BLD AUTO: 6.21 X10(3)/MCL (ref 2.1–9.2)
NEUTROPHILS NFR BLD AUTO: 64.6 %
NRBC BLD AUTO-RTO: 0 %
PHOSPHATE SERPL-MCNC: 4.3 MG/DL (ref 2.3–4.7)
PLATELET # BLD AUTO: 203 X10(3)/MCL (ref 130–400)
PMV BLD AUTO: 10.5 FL (ref 7.4–10.4)
POTASSIUM SERPL-SCNC: 3.6 MMOL/L (ref 3.5–5.1)
RBC # BLD AUTO: 4.69 X10(6)/MCL (ref 4.7–6.1)
SODIUM SERPL-SCNC: 142 MMOL/L (ref 136–145)
WBC # BLD AUTO: 9.62 X10(3)/MCL (ref 4.5–11.5)

## 2025-07-30 PROCEDURE — 97161 PT EVAL LOW COMPLEX 20 MIN: CPT

## 2025-07-30 PROCEDURE — 25000242 PHARM REV CODE 250 ALT 637 W/ HCPCS: Performed by: FAMILY MEDICINE

## 2025-07-30 PROCEDURE — 83735 ASSAY OF MAGNESIUM: CPT | Performed by: INTERNAL MEDICINE

## 2025-07-30 PROCEDURE — 25000003 PHARM REV CODE 250: Performed by: NURSE PRACTITIONER

## 2025-07-30 PROCEDURE — 36415 COLL VENOUS BLD VENIPUNCTURE: CPT | Performed by: INTERNAL MEDICINE

## 2025-07-30 PROCEDURE — 97116 GAIT TRAINING THERAPY: CPT

## 2025-07-30 PROCEDURE — 84100 ASSAY OF PHOSPHORUS: CPT | Performed by: INTERNAL MEDICINE

## 2025-07-30 PROCEDURE — 94761 N-INVAS EAR/PLS OXIMETRY MLT: CPT

## 2025-07-30 PROCEDURE — 63600175 PHARM REV CODE 636 W HCPCS: Performed by: INTERNAL MEDICINE

## 2025-07-30 PROCEDURE — 25000003 PHARM REV CODE 250: Performed by: INTERNAL MEDICINE

## 2025-07-30 PROCEDURE — 94640 AIRWAY INHALATION TREATMENT: CPT

## 2025-07-30 PROCEDURE — 63700000 PHARM REV CODE 250 ALT 637 W/O HCPCS: Performed by: INTERNAL MEDICINE

## 2025-07-30 PROCEDURE — 25000242 PHARM REV CODE 250 ALT 637 W/ HCPCS: Performed by: INTERNAL MEDICINE

## 2025-07-30 PROCEDURE — 21400001 HC TELEMETRY ROOM

## 2025-07-30 PROCEDURE — 85025 COMPLETE CBC W/AUTO DIFF WBC: CPT | Performed by: INTERNAL MEDICINE

## 2025-07-30 PROCEDURE — 80048 BASIC METABOLIC PNL TOTAL CA: CPT | Performed by: INTERNAL MEDICINE

## 2025-07-30 PROCEDURE — 99900035 HC TECH TIME PER 15 MIN (STAT)

## 2025-07-30 RX ORDER — CARVEDILOL 12.5 MG/1
12.5 TABLET ORAL 2 TIMES DAILY
Qty: 60 TABLET | Refills: 0 | Status: SHIPPED | OUTPATIENT
Start: 2025-07-30 | End: 2025-08-29

## 2025-07-30 RX ORDER — CEFUROXIME AXETIL 250 MG/1
500 TABLET ORAL EVERY 12 HOURS
Status: DISCONTINUED | OUTPATIENT
Start: 2025-07-30 | End: 2025-07-31 | Stop reason: HOSPADM

## 2025-07-30 RX ORDER — FUROSEMIDE 20 MG/1
20 TABLET ORAL 2 TIMES DAILY
Qty: 60 TABLET | Refills: 0 | Status: SHIPPED | OUTPATIENT
Start: 2025-07-30 | End: 2025-08-29

## 2025-07-30 RX ORDER — TAMSULOSIN HYDROCHLORIDE 0.4 MG/1
0.4 CAPSULE ORAL NIGHTLY
Qty: 30 CAPSULE | Refills: 0 | Status: SHIPPED | OUTPATIENT
Start: 2025-07-30 | End: 2025-08-29

## 2025-07-30 RX ORDER — LEVALBUTEROL INHALATION SOLUTION 1.25 MG/3ML
1.25 SOLUTION RESPIRATORY (INHALATION) EVERY 8 HOURS PRN
Status: DISCONTINUED | OUTPATIENT
Start: 2025-07-30 | End: 2025-07-31 | Stop reason: HOSPADM

## 2025-07-30 RX ORDER — DAPAGLIFLOZIN 10 MG/1
10 TABLET, FILM COATED ORAL DAILY
Qty: 30 TABLET | Refills: 0 | Status: SHIPPED | OUTPATIENT
Start: 2025-07-31 | End: 2025-08-30

## 2025-07-30 RX ORDER — CEFUROXIME AXETIL 500 MG/1
500 TABLET ORAL EVERY 12 HOURS
Qty: 14 TABLET | Refills: 0 | Status: SHIPPED | OUTPATIENT
Start: 2025-07-30 | End: 2025-08-06

## 2025-07-30 RX ORDER — CARVEDILOL 12.5 MG/1
12.5 TABLET ORAL 2 TIMES DAILY
Status: DISCONTINUED | OUTPATIENT
Start: 2025-07-30 | End: 2025-07-31 | Stop reason: HOSPADM

## 2025-07-30 RX ORDER — AZITHROMYCIN 500 MG/1
500 TABLET, FILM COATED ORAL DAILY
Qty: 7 TABLET | Refills: 0 | Status: SHIPPED | OUTPATIENT
Start: 2025-07-31 | End: 2025-08-07

## 2025-07-30 RX ADMIN — BUDESONIDE INHALATION 0.5 MG: 0.5 SUSPENSION RESPIRATORY (INHALATION) at 07:07

## 2025-07-30 RX ADMIN — LEVALBUTEROL HYDROCHLORIDE 1.25 MG: 1.25 SOLUTION RESPIRATORY (INHALATION) at 07:07

## 2025-07-30 RX ADMIN — FUROSEMIDE 20 MG: 20 TABLET ORAL at 10:07

## 2025-07-30 RX ADMIN — FLUTICASONE FUROATE AND VILANTEROL TRIFENATATE 1 PUFF: 100; 25 POWDER RESPIRATORY (INHALATION) at 10:07

## 2025-07-30 RX ADMIN — DAPAGLIFLOZIN 10 MG: 10 TABLET, FILM COATED ORAL at 10:07

## 2025-07-30 RX ADMIN — FINASTERIDE 5 MG: 5 TABLET, FILM COATED ORAL at 10:07

## 2025-07-30 RX ADMIN — ENOXAPARIN SODIUM 40 MG: 40 INJECTION SUBCUTANEOUS at 06:07

## 2025-07-30 RX ADMIN — SACUBITRIL AND VALSARTAN 1 TABLET: 24; 26 TABLET, FILM COATED ORAL at 10:07

## 2025-07-30 RX ADMIN — CARVEDILOL 12.5 MG: 12.5 TABLET, FILM COATED ORAL at 09:07

## 2025-07-30 RX ADMIN — PANTOPRAZOLE SODIUM 40 MG: 40 TABLET, DELAYED RELEASE ORAL at 10:07

## 2025-07-30 RX ADMIN — CEFUROXIME AXETIL 500 MG: 250 TABLET ORAL at 09:07

## 2025-07-30 RX ADMIN — AZITHROMYCIN DIHYDRATE 500 MG: 250 TABLET ORAL at 10:07

## 2025-07-30 RX ADMIN — TAMSULOSIN HYDROCHLORIDE 0.4 MG: 0.4 CAPSULE ORAL at 09:07

## 2025-07-30 RX ADMIN — SACUBITRIL AND VALSARTAN 1 TABLET: 24; 26 TABLET, FILM COATED ORAL at 09:07

## 2025-07-30 RX ADMIN — CEFUROXIME AXETIL 500 MG: 250 TABLET ORAL at 12:07

## 2025-07-30 RX ADMIN — CARVEDILOL 6.25 MG: 6.25 TABLET, FILM COATED ORAL at 10:07

## 2025-07-30 RX ADMIN — ATORVASTATIN CALCIUM 20 MG: 20 TABLET, FILM COATED ORAL at 10:07

## 2025-07-30 NOTE — PROGRESS NOTES
[facility]                     [address] [place] [address]    PATIENT NAME:       KWASI HUTCHINSON  YOB: 1955  CSN:                [id number]   MRN:                [id number]  ADMIT DATE:         07/25/2025 09:19:00  PHYSICIAN:          Erich Borja MD                            PROGRESS NOTE    DATE:      CODE STATUS:  Full code.    SUBJECTIVE:  The patient was visited in the room and he has the LifeVest today,   so I will be discharging him tomorrow.  Home health care has been arranged, so   that the patient can be educated about the diet as he has some degree of slow   learning and mental capacity not to grasp things as normal and very mediocre   level of education.  He will need all the help, but once he understands, he has   been very compliant in the clinic over the last few years.  I have tried my best   to explain to him, so that there is no duplication of the medications and   especially the intake of his salt, especially in the canned foods and nurses   have been doing the best to educate this nice gentleman.  The patient was   admitted for shortness of breath, was found to have small airway disease,   pneumonic process and ejection fraction was showing systolic dysfunction.  He   was put on appropriate medications for systolic dysfunction and Cardiology was   consulted.  They will see him as soon as possible and definitely the patient   deserves angiogram to see if there is any ischemic pathology for this systolic   dysfunction, though it has come suddenly.  My hunch is it will be non ischemic  as the   patient has never had exertional shortness of breath.  His blood pressure has   always been controlled.  He has been a nonsmoker.  He is not a diabetic.  His   risk factors of dyslipidemia are well controlled.  It will be very odd if   ischemic cardiomyopathy is found on the angiogram.  Nevertheless, he  deserves an   angiogram for the further management of his systolic dysfunction or   cardiomyopathy, which has been recently diagnosed.    OBJECTIVE:  VITAL SIGNS:  His vitals are as follows, 130/88 blood pressure, 99.9   temperature, O2 sat 95%, pulse 90.  HEENT:  Head is normocephalic.  Pupils bilaterally reactive, equal in size.    Mild conjunctival pallor.  No scleral icterus.  NECK:  Trachea is midline.  CHEST:  Has good bilateral air entry.  CVS:  First and second heart sounds are heard normally without any added   murmurs.  ABDOMEN:  Soft and nontender.  EXTREMITIES:  Devoid of edema, cyanosis, or clubbing.    LABS AND INVESTIGATIONS:  WBC 9.62, hemoglobin 13.4, hematocrit 40.2, platelet   count 203.  Sodium 142, potassium 3.6, chloride 109, BUN 16, creatinine 1.27,   GFR of more than 60.    IMPRESSION:    1. Systolic dysfunction and shortness of breath secondary to new-onset   congestive heart failure, systolic in nature.  Ejection fraction less than 35%   on echocardiogram.  No valvular pathology.  2. Pneumonic process, treated with IV antibiotics, small volume nebulizers, and   supplemental oxygen as needed.  3. Small airway disease.  The patient treated with bronchodilators.  4. History of hypertension.  5. History of benign prostatic hyperplasia with lower urinary tract symptoms.  6. History of obesity.    PLAN:  Continue the present line of treatment.  The patient will be discharged   tomorrow.  LifeVest in place.  Appointment with [facility] in [place] as soon as   possible.  I will follow him in the clinic in 1 week.  Home health care has been   arranged.    Pleasure taking care of Mr. Shante Chiu during his stay at [facility] on third floor.        ______________________________  MD JIM Thompson/JAIRON  DD:  07/30/2025  Time:  03:18PM  DT:  07/30/2025  Time:  05:46PM  Job #:  [id number]      PROGRESS NOTE

## 2025-07-30 NOTE — PLAN OF CARE
07/29/25 1500   Discharge Assessment   Assessment Type Discharge Planning Assessment   Confirmed/corrected address, phone number and insurance Yes   Confirmed Demographics Correct on Facesheet   Source of Information patient;health record   People in Home friend(s)   Do you expect to return to your current living situation? Yes   Do you have help at home or someone to help you manage your care at home? Yes   Prior to hospitilization cognitive status: Alert/Oriented;No Deficits   Current cognitive status: Alert/Oriented;No Deficits   Walking or Climbing Stairs Difficulty no   Dressing/Bathing Difficulty no   Equipment Currently Used at Home none   Patient currently being followed by outpatient case management? Unable to determine (comments)   Do you currently have service(s) that help you manage your care at home? No   Do you take prescription medications? Yes   Do you have prescription coverage? Yes   Do you have any problems affording any of your prescribed medications? No   Is the patient taking medications as prescribed? yes   Who is going to help you get home at discharge? friends   How do you get to doctors appointments? family or friend will provide   Are you on dialysis? No   Do you take coumadin? No   Discharge Plan A Home Health   Discharge Plan B Home Health  (Lifevest)   DME Needed Upon Discharge  other (see comments)  (Lifevest)   Discharge Plan discussed with: Patient;Friend   Transition of Care Barriers None   Physical Activity   On average, how many days per week do you engage in moderate to strenuous exercise (like a brisk walk)? 0 days   On average, how many minutes do you engage in exercise at this level? 0 min   Financial Resource Strain   How hard is it for you to pay for the very basics like food, housing, medical care, and heating? Not very   Housing Stability   In the last 12 months, was there a time when you were not able to pay the mortgage or rent on time? N   At any time in the past 12  months, were you homeless or living in a shelter (including now)? N   Transportation Needs   In the past 12 months, has lack of transportation kept you from medical appointments or from getting medications? no   In the past 12 months, has lack of transportation kept you from meetings, work, or from getting things needed for daily living? No   Food Insecurity   Within the past 12 months, you worried that your food would run out before you got the money to buy more. Never true   Within the past 12 months, the food you bought just didn't last and you didn't have money to get more. Never true   Stress   Do you feel stress - tense, restless, nervous, or anxious, or unable to sleep at night because your mind is troubled all the time - these days? Only a littl   Social Isolation   How often do you feel lonely or isolated from those around you?  Never   Alcohol Use   Q1: How often do you have a drink containing alcohol? Never   Q2: How many drinks containing alcohol do you have on a typical day when you are drinking? None   Q3: How often do you have six or more drinks on one occasion? Never   [employer]   In the past 12 months has the electric, gas, oil, or water company threatened to shut off services in your home? No   Health Literacy   How often do you need to have someone help you when you read instructions, pamphlets, or other written material from your doctor or pharmacy? Sometimes     [facility] arranged for d/c home tomorrow.  Patient fitted for Lifevest today and has it with him in room for d/c home.

## 2025-07-30 NOTE — PT/OT/SLP EVAL
"Physical Therapy Evaluation    Patient Name:  Ruben Frey   MRN:  [id number]    Recommendations:     Discharge Recommendations: Low Intensity Therapy   Discharge Equipment Recommendations: none   Barriers to discharge: None    Assessment:     Ruben Frey is a 69 y.o. male admitted with a medical diagnosis of SOB (shortness of breath) on exertion.  He presents with the following impairments/functional limitations: impaired endurance, impaired balance .    Assessment and tx: Patient presents sitting up in bed, life vest donned, agreeable to therapy. Patient does exhibit some endurance deficits but was able to ambulate community distances (~200ft) while singing on room air before becoming "a little" short of breath (per patient just a little). Pt's HR and SPO2 were monitored and maintained WNL throughout. Patient was educated on the benefits of out of bed activity and was agreeable to sit up in his recliner. Patient is a good candidate to return home where he lives in an Elderly living apartment complex and appears to have good support by friends.    Rehab Prognosis: Good; patient would benefit from acute skilled PT services to address these deficits and reach maximum level of function.    Recent Surgery: * No surgery found *      Plan:     During this hospitalization, patient to be seen 5 x/week to address the identified rehab impairments via gait training, therapeutic exercises, therapeutic activities and progress toward the following goals:    Plan of Care Expires:       Subjective     Chief Complaint: SOB with ambulation  Patient/Family Comments/goals: to go home  Pain/Comfort:  Pain Rating 1: 0/10    Patients cultural, spiritual, Quaker conflicts given the current situation:      Living Environment:  Patient reports he lives at "[facility]" (an elderly apartment complex in [place] - per patient's friend). Patient reports he was independent with ambulation and ADLs prior to admit.   Prior to admission, " "patients level of function was .  Equipment used at home: none.  DME owned (not currently used): none.  Upon discharge, patient will have assistance from friends.    Objective:     Communicated with nurse prior to session.  Patient found HOB elevated with bed alarm, peripheral IV, Other (comments), telemetry (life vest)  upon PT entry to room.    General Precautions: Standard,    Orthopedic Precautions:N/A   Braces: N/A  Respiratory Status: Room air    Exams:  RLE ROM: WFL  RLE Strength: WFL  LLE ROM: WFL  LLE Strength: WFL    Functional Mobility:  Bed Mobility:     Supine to Sit: independence  Transfers:     Sit to Stand:  supervision with rolling walker  Bed to Chair: supervision with  rolling walker  using  Step Transfer  Gait: Pt ambulated ~80ft, seated rest and then was able to ambulate community distances (~200ft) while singing on room air before becoming "a little" short of breath (per patient just a little). Pt's HR and SPO2 were monitored and maintained WNL throughout. RW and SPV          Patient left up in chair with all lines intact, call button in reach, chair alarm on, nurse notified, and friends present.    GOALS:   Multidisciplinary Problems       Physical Therapy Goals          Problem: Physical Therapy    Goal Priority Disciplines Outcome Interventions   Physical Therapy Goal     PT, PT/OT Progressing    Description: Goals to be met by: discharge     Patient will increase functional independence with mobility by performin. Gait  x 150 feet with [place] using No Assistive Device.                          DME Justifications:  No DME recommended requiring DME justifications    History:     Past Medical History:   Diagnosis Date    BPH (benign prostatic hyperplasia)     GERD (gastroesophageal reflux disease)     Hypertension     Mixed hyperlipidemia        History reviewed. No pertinent surgical history.    Time Tracking:     PT Received On: 25  PT Start Time: 1346     PT Stop Time: " 1412  PT Total Time (min): 26 min     Billable Minutes: Evaluation 15 and Gait Training 11      07/30/2025

## 2025-07-31 VITALS
DIASTOLIC BLOOD PRESSURE: 80 MMHG | OXYGEN SATURATION: 97 % | HEIGHT: 65 IN | TEMPERATURE: 98 F | HEART RATE: 77 BPM | WEIGHT: 200.38 LBS | RESPIRATION RATE: 18 BRPM | BODY MASS INDEX: 33.38 KG/M2 | SYSTOLIC BLOOD PRESSURE: 113 MMHG

## 2025-07-31 PROCEDURE — 99900035 HC TECH TIME PER 15 MIN (STAT)

## 2025-07-31 PROCEDURE — 94760 N-INVAS EAR/PLS OXIMETRY 1: CPT

## 2025-07-31 PROCEDURE — 25000003 PHARM REV CODE 250: Performed by: INTERNAL MEDICINE

## 2025-07-31 PROCEDURE — 94761 N-INVAS EAR/PLS OXIMETRY MLT: CPT

## 2025-07-31 PROCEDURE — 25000003 PHARM REV CODE 250: Performed by: NURSE PRACTITIONER

## 2025-07-31 PROCEDURE — 63700000 PHARM REV CODE 250 ALT 637 W/O HCPCS: Performed by: INTERNAL MEDICINE

## 2025-07-31 RX ADMIN — FINASTERIDE 5 MG: 5 TABLET, FILM COATED ORAL at 09:07

## 2025-07-31 RX ADMIN — SACUBITRIL AND VALSARTAN 1 TABLET: 24; 26 TABLET, FILM COATED ORAL at 09:07

## 2025-07-31 RX ADMIN — FLUTICASONE FUROATE AND VILANTEROL TRIFENATATE 1 PUFF: 100; 25 POWDER RESPIRATORY (INHALATION) at 09:07

## 2025-07-31 RX ADMIN — PANTOPRAZOLE SODIUM 40 MG: 40 TABLET, DELAYED RELEASE ORAL at 09:07

## 2025-07-31 RX ADMIN — CARVEDILOL 12.5 MG: 12.5 TABLET, FILM COATED ORAL at 09:07

## 2025-07-31 RX ADMIN — ATORVASTATIN CALCIUM 20 MG: 20 TABLET, FILM COATED ORAL at 09:07

## 2025-07-31 RX ADMIN — CEFUROXIME AXETIL 500 MG: 250 TABLET ORAL at 09:07

## 2025-07-31 RX ADMIN — AZITHROMYCIN DIHYDRATE 500 MG: 250 TABLET ORAL at 09:07

## 2025-07-31 RX ADMIN — DAPAGLIFLOZIN 10 MG: 10 TABLET, FILM COATED ORAL at 09:07

## 2025-07-31 RX ADMIN — FUROSEMIDE 20 MG: 20 TABLET ORAL at 09:07

## 2025-07-31 NOTE — NURSING
Pt discharged home with friends times two     PT noted to be in stable condition     PT stated understanding of Life Vest     PT hard scripts given     PT stated understanding of discharge docs and orders

## 2025-07-31 NOTE — PROGRESS NOTES
Inpatient Nutrition Assessment    Admit Date: 7/25/2025   Total duration of encounter: 6 days   Patient Age: 69 y.o.    Nutrition Recommendation/Prescription     Diet Low Sodium (2 gm) Standard Tray  Diet Cardiac.  Daily wts.  Monitor lytes and replete as needed.  RD to monitor wt, labs, medications, and po intake.    Could not complete diet education today, will attempt to provide low sodium diet handouts at f/u. Thank you.    Communication of Recommendations: EMR    Nutrition Assessment     Malnutrition Assessment/Nutrition-Focused Physical Exam  Unable to complete NFPE at this time. Will attempt upon f/u.     Malnutrition Level: other (see comments) (Unable to assess) (07/29/25 0912)  Energy Intake (Malnutrition): other (see comments) (Does not meet criteria) (07/29/25 0912)  Weight Loss (Malnutrition): other (see comments) (Unable to assess) (07/29/25 0912)                                         Fluid Accumulation (Malnutrition): moderate (07/29/25 0912)     Hand  Strength, Right (Malnutrition): Unable to assess (07/29/25 0912)  A minimum of two characteristics is recommended for diagnosis of either severe or non-severe malnutrition.    Chart Review    Reason Seen: physician consult for new onset CHF and follow-up    Malnutrition Screening Tool Results   Have you recently lost weight without trying?: No  Have you been eating poorly because of a decreased appetite?: No   MST Score: 0   Diagnosis:   Pneumonia 7/25/2025      Acute on chronic congestive heart failure 7/25/2025      Atypical chest pain 7/25/2025      Abdominal pain 7/26/2025       Relevant Medical History:    BPH (benign prostatic hyperplasia)      GERD (gastroesophageal reflux disease)      Hypertension      Mixed hyperlipidemia        Scheduled Medications:  atorvastatin, 20 mg, Daily  azithromycin, 500 mg, Daily  carvediloL, 12.5 mg, BID  cefUROXime, 500 mg, Q12H  dapagliflozin propanediol, 10 mg, Daily  enoxparin, 40 mg, Q24H (prophylaxis,  1700)  finasteride, 5 mg, Daily  fluticasone furoate-vilanteroL, 1 puff, Daily  furosemide, 20 mg, Daily  pantoprazole, 40 mg, Daily  sacubitriL-valsartan, 1 tablet, BID  tamsulosin, 0.4 mg, QHS    Continuous Infusions:   PRN Medications:  levalbuterol, 1.25 mg, Q8H PRN  melatonin, 6 mg, Nightly PRN  sodium chloride 0.9%, 10 mL, PRN    Calorie Containing IV Medications: no significant kcals from medications at this time    Recent Labs   Lab 07/25/25  0920 07/26/25  0340 07/27/25  0312 07/29/25  0255 07/30/25  0320    144 141 143 142   K 3.5 3.7 3.4* 3.9 3.6   CALCIUM 9.1 8.9 8.6* 8.7* 8.9   PHOS  --  4.2 3.6 3.7 4.3   MG  --  2.10 2.00 2.00 2.10   * 111* 109* 109* 109*   CO2 26 25 22* 26 24   BUN 11.0 12.0 14.0 18.0 16.0   CREATININE 1.30* 1.28* 1.18 1.48* 1.27*   EGFRNORACEVR 60 >60 >60 51 >60    107 103 110 110   BILITOT 0.9  --  0.7  --   --    ALKPHOS 55  --  59  --   --    ALT 28  --  26  --   --    AST 23  --  23  --   --    ALBUMIN 3.6  --  3.5  --   --    LIPASE 14  --   --   --   --    WBC 7.41 7.94 11.42 9.83 9.62   HGB 13.1* 12.5* 13.3* 12.5* 13.4*   HCT 40.0* 38.2* 40.4* 38.4* 40.2*     Nutrition Orders:  Diet Low Sodium (2 gm) Standard Tray  Diet Cardiac      Appetite/Oral Intake: good/% of meals  Factors Affecting Nutritional Intake: none identified  Social Needs Impacting Access to Food: unable to assess at this time; will attempt on follow-up  Food/Anglican/Cultural Preferences: unable to obtain  Food Allergies: no known food allergies  Last Bowel Movement: 07/27/25  Wound(s):  skin intact    Comments    7/28/25: Pt NPO at this time. Previously on Low Sodium diet, consuming % of meals. Rec'd resume diet once medically appropriate. Unable to perform NFPE today, will attempt upon f/u. Wt hx limited. Pt weighing 220# 5 years ago, #. Per MST: no wt loss or decreased po intake reported. Labs/meds reviewed. Will assess need for ONS once diet advanced.    7/29/25: Diet  "advanced to low sodium. Pt tolerating with good oral intake, consuming 100% of meals. SOB and coughing have improved per MD. RD consulted for new onset CHF, unable to provide diet education at this time, will attempt to provide low sodium/heart healthy education at f/u 7/31. No reported n/v/d/c. No new weights recorded. Labs/meds reviewed.    Anthropometrics    Height: 5' 5" (165.1 cm), Height Method: Stated  Last Weight: 90.9 kg (200 lb 6.4 oz) (07/31/25 0515), Weight Method: Bed Scale  BMI (Calculated): 33.3  BMI Classification: obese grade I (BMI 30-34.9)        Ideal Body Weight (IBW), Male: 136 lb     % Ideal Body Weight, Male (lb): 147.19 %                          Usual Weight Provided By: unable to obtain usual weight    Wt Readings from Last 5 Encounters:   07/31/25 90.9 kg (200 lb 6.4 oz)   07/10/25 91.6 kg (202 lb)     Weight Change(s) Since Admission:   7/28: 90.8 kg  7/29: no new wts  Wt Readings from Last 1 Encounters:   07/31/25 0515 90.9 kg (200 lb 6.4 oz)   07/30/25 0526 88 kg (194 lb 0.1 oz)   07/25/25 1534 90.8 kg (200 lb 2.8 oz)   07/25/25 0916 91.6 kg (202 lb)   Admit Weight: 91.6 kg (202 lb) (07/25/25 0916), Weight Method: Stated    Estimated Needs    Weight Used For Calorie Calculations: 90.8 kg (200 lb 2.8 oz)  Energy Calorie Requirements (kcal): 9341-7718 kcal (1.1-1.2 SFxMSJ)  Energy Need Method: [facility]  Weight Used For Protein Calculations: 90.8 kg (200 lb 2.8 oz)  Protein Requirements: 100-118 g (1.1-1.3 g/kg)  Fluid Requirements (mL): 2270 mL/d (25 mL/kg)        Enteral Nutrition     Patient not receiving enteral nutrition at this time.    Parenteral Nutrition     Patient not receiving parenteral nutrition support at this time.    Evaluation of Received Nutrient Intake    Calories: meeting estimated needs  Protein: meeting estimated needs    Patient Education     Not applicable.     Nutrition Diagnosis     PES: Inadequate oral intake related to inability to consume sufficient " nutrients as evidenced by pt NPO. (resolved)     PES:            Nutrition Interventions     Intervention(s): Sodium modified diet, Content related nutrition education, and Collaboration and referral of nutrition care  Intervention(s):      Goal: Maintain weight throughout hospitalization. (goal progressing)  Goal: Meet greater than 80% of nutritional needs by follow-up. (goal met)    Nutrition Goals & Monitoring     Dietitian will monitor: energy intake, weight, electrolyte/renal panel, glucose/endocrine profile, and gastrointestinal profile  Discharge planning: continue low sodium diet  Nutrition Risk/Follow-Up: dietitian will follow-up one time per week   Please consult if re-assessment needed sooner.

## 2025-07-31 NOTE — PLAN OF CARE
07/31/25 1012   Final Note   Assessment Type Final Discharge Note   Anticipated Discharge Disposition Home-Health   Hospital Resources/Appts/Education Provided Post-Acute resouces added to AVS   Post-Acute Status   Post-Acute Authorization Home Health;HME   HME Status Set-up Complete/Auth obtained   Home Health Status Set-up Complete/Auth obtained   Discharge Delays None known at this time     D/c home w/HH and Lifevest. D/C info sent to [facility], pt has lifevest on.  Patient can d/c, notified nurse.

## 2025-08-01 NOTE — DISCHARGE SUMMARY
[facility]                     [address] [address]    PATIENT NAME:       RUBEN HUTCHINSON  YOB: 1955  CSN:                [id number]   MRN:                [id number]  ADMIT DATE:         07/25/2025 09:19:00  PHYSICIAN:          Erich Borja MD                          DISCHARGE SUMMARY    DATE OF DISCHARGE:  07/31/2025 11:03:00    DISCHARGE DIAGNOSES:    1. Exertional shortness of breath, multifactorial origin pneumonic process,   small airway disease and congestive heart failure secondary to increased BNP,   systolic dysfunction with ejection fraction less than 35%.  The patient being   discharged after treatment with antibiotics on LifeVest to be following with [facility]   closely within next few weeks.  2. New onset systolic dysfunction that is cardiomyopathy with reduced ejection   fraction on echocardiogram, status post Cardiology consult.  The patient on   appropriate medicines except the addition of spironolactone, which will be done   as an outpatient.  3. Pneumonic process, treated with IV antibiotics.  4. Orthopnea secondary to new onset congestive heart failure, now totally   resolved.  The patient can lie recumbent and sleep without any shortness of   breath.  5. History of hypertension.  6. History of dyslipidemia.  7. History of gastroesophageal reflux disease.  8. History of BPH with lower urinary tract symptoms.  9. History of hyperreactive airway disease.  10. Asymptomatic cholelithiasis, incidental finding.    HOSPITAL COURSE:  Mr. Ruben Hutchinson is a very pleasant 69-year-old gentleman,   who had come to the emergency couple of times for shortness of breath, was sent   back because it was felt to be hyperreactive airway disease versus bronchitis.    He was again having shortness of breath and was found to be hypoxemic.  His   shortness of breath was on exertion.  This time, his BNP was  elevated, from 301   it had gone to 1000 and he responded very well.  His shortness of breath   responded very well to IV diuretic, and the patient diuresed 2000 cc within the   next few hours.  He was admitted and a chest x-ray also showed pneumonic process   versus small airway disease.  He was placed on small volume nebulizers,   supplemental oxygen, but he was still orthopneic and was not able to sleep.  IV   Lasix was continued and so also his home medications.  The patient's   echocardiogram showed that he had systolic dysfunction, which was new in nature   and explained all his symptomatology with reduced ejection fraction less than   35%.  The patient was placed on Entresto, diuretics, carvedilol, and Farxiga.    Cardiology was consulted.  They agreed with the process.  Antibiotics were   completed for his pneumonic process and the patient's orthopnea slowly responded   very well.  LifeVest was arranged for him and the patient has been educated   about his diet, especially the salt intake.  His ABG showed hypoxemia, which was   compatible with pneumonic process as well as congestive heart failure, systolic   dysfunction.  He will be following with me as well as [facility] very closely.  The   patient is being discharged today.  During the hospital stay, DVT prophylaxis   was given with Lovenox.  GI prophylaxis with proton pump inhibitors.  He has   also been given home health care to manage the early phases of his new-onset   systolic dysfunction or congestive heart failure with reduced ejection fraction.    DISCHARGE MEDICATIONS:  As follows:  1. Azithromycin 500 mg daily for 7 days.  2. Coreg 12.5 mg b.i.d.  3. Cefuroxime 500 mg daily for 7 days.  4. Farxiga 10 mg daily.  5. Entresto 24/26 twice daily.  6. Tamsulosin 0.4 mg daily.  7. Lasix 20 mg daily twice daily.  8. Atorvastatin 20 mg daily.  9. Finasteride 5 mg daily.  10. Symbicort 80/4.5 two puffs b.i.d.    Medication which has been stopped is his previous  blood pressure medicine,   amlodipine, and valsartan.    LABS AND INVESTIGATIONS:  As follows; echocardiogram showing ejection fraction   of 25% to 30% with severe global hypokinesis.  Left ventricle is severely   dilated.  BNP was 900, WBC 8.6, hemoglobin 13.4, hematocrit 40.2, platelet count   adequate.  Sodium 142, potassium 3.6, chloride 109, creatinine 1.27, BUN 16,   GFR of more than 60.  ABG; pH 7.9, pCO2 of 28.1, pO2 of 64, O2 sat 94%.  CAT   scan of the abdomen and pelvis without contrast, no acute process of the abdomen   and pelvis.  Other secondary findings as above constellation of the left lung   base.  CT angiogram, no significant filling defects noted to the pulmonary   arteries.  Hazy ground-glass opacities throughout the lung bilaterally, which   represents small airway disease, atypical pneumonia cannot be ruled out.    Bilateral pleural effusions.  Ultrasound of the gallbladder showed   cholelithiasis without gallbladder thickening.    Dr. Wolfgang Ramirez will be following the patient for his systolic dysfunction.  The   patient does have LifeVest on and has been discharged with that.    It was pleasure taking care of Mr. Ruben Frey during his stay at [facility].        ______________________________  Erich Borja MD    SS/AQS  DD:  07/31/2025  Time:  07:42PM  DT:  07/31/2025  Time:  09:07PM  Job #:  [id number]    cc:   Wolfgang Ramirez MD        DISCHARGE SUMMARY

## 2025-08-29 RX ORDER — FLUTICASONE FUROATE AND VILANTEROL 100; 25 UG/1; UG/1
1 POWDER RESPIRATORY (INHALATION) DAILY
COMMUNITY

## 2025-08-29 RX ORDER — OMEPRAZOLE 40 MG/1
40 CAPSULE, DELAYED RELEASE ORAL DAILY
COMMUNITY
Start: 2025-06-23

## 2025-08-29 RX ORDER — ERGOCALCIFEROL 1.25 MG/1
50000 CAPSULE ORAL
COMMUNITY
Start: 2025-07-25

## 2025-09-02 ENCOUNTER — HOSPITAL ENCOUNTER (OUTPATIENT)
Facility: HOSPITAL | Age: 70
Discharge: HOME | End: 2025-09-02
Attending: INTERNAL MEDICINE | Admitting: INTERNAL MEDICINE
Payer: MEDICARE

## 2025-09-02 VITALS
DIASTOLIC BLOOD PRESSURE: 79 MMHG | HEART RATE: 83 BPM | SYSTOLIC BLOOD PRESSURE: 133 MMHG | OXYGEN SATURATION: 96 % | HEIGHT: 65 IN | TEMPERATURE: 98 F | WEIGHT: 202 LBS | RESPIRATION RATE: 15 BRPM | BODY MASS INDEX: 33.66 KG/M2

## 2025-09-02 DIAGNOSIS — R07.9 CHEST PAIN: ICD-10-CM

## 2025-09-02 DIAGNOSIS — I42.9 CARDIOMYOPATHY: ICD-10-CM

## 2025-09-02 PROCEDURE — 99152 MOD SED SAME PHYS/QHP 5/>YRS: CPT | Performed by: INTERNAL MEDICINE

## 2025-09-02 PROCEDURE — 25500020 PHARM REV CODE 255: Performed by: INTERNAL MEDICINE

## 2025-09-02 PROCEDURE — C1769 GUIDE WIRE: HCPCS | Performed by: INTERNAL MEDICINE

## 2025-09-02 PROCEDURE — 93458 L HRT ARTERY/VENTRICLE ANGIO: CPT | Performed by: INTERNAL MEDICINE

## 2025-09-02 PROCEDURE — C1887 CATHETER, GUIDING: HCPCS | Performed by: INTERNAL MEDICINE

## 2025-09-02 PROCEDURE — 25000003 PHARM REV CODE 250: Performed by: INTERNAL MEDICINE

## 2025-09-02 PROCEDURE — C1894 INTRO/SHEATH, NON-LASER: HCPCS | Performed by: INTERNAL MEDICINE

## 2025-09-02 PROCEDURE — 25000003 PHARM REV CODE 250

## 2025-09-02 PROCEDURE — 63600175 PHARM REV CODE 636 W HCPCS: Performed by: INTERNAL MEDICINE

## 2025-09-02 RX ORDER — SODIUM CHLORIDE 9 MG/ML
INJECTION, SOLUTION INTRAVENOUS CONTINUOUS
Status: ACTIVE | OUTPATIENT
Start: 2025-09-02 | End: 2025-09-02

## 2025-09-02 RX ORDER — ASPIRIN 325 MG
325 TABLET ORAL
Status: DISCONTINUED | OUTPATIENT
Start: 2025-09-02 | End: 2025-09-02 | Stop reason: HOSPADM

## 2025-09-02 RX ORDER — MORPHINE SULFATE 2 MG/ML
2 INJECTION, SOLUTION INTRAMUSCULAR; INTRAVENOUS EVERY 4 HOURS PRN
Refills: 0 | Status: DISCONTINUED | OUTPATIENT
Start: 2025-09-02 | End: 2025-09-02 | Stop reason: HOSPADM

## 2025-09-02 RX ORDER — ACETAMINOPHEN 325 MG/1
650 TABLET ORAL EVERY 4 HOURS PRN
Status: DISCONTINUED | OUTPATIENT
Start: 2025-09-02 | End: 2025-09-02 | Stop reason: HOSPADM

## 2025-09-02 RX ORDER — LIDOCAINE HYDROCHLORIDE 20 MG/ML
INJECTION, SOLUTION EPIDURAL; INFILTRATION; INTRACAUDAL; PERINEURAL
Status: DISCONTINUED | OUTPATIENT
Start: 2025-09-02 | End: 2025-09-02 | Stop reason: HOSPADM

## 2025-09-02 RX ORDER — DAPAGLIFLOZIN 10 MG/1
10 TABLET, FILM COATED ORAL DAILY
COMMUNITY

## 2025-09-02 RX ORDER — IOPAMIDOL 755 MG/ML
INJECTION, SOLUTION INTRAVASCULAR
Status: DISCONTINUED | OUTPATIENT
Start: 2025-09-02 | End: 2025-09-02 | Stop reason: HOSPADM

## 2025-09-02 RX ORDER — FENTANYL CITRATE 50 UG/ML
INJECTION, SOLUTION INTRAMUSCULAR; INTRAVENOUS
Status: DISCONTINUED | OUTPATIENT
Start: 2025-09-02 | End: 2025-09-02 | Stop reason: HOSPADM

## 2025-09-02 RX ORDER — NITROGLYCERIN 0.4 MG/1
0.4 TABLET SUBLINGUAL EVERY 5 MIN PRN
Status: DISCONTINUED | OUTPATIENT
Start: 2025-09-02 | End: 2025-09-02 | Stop reason: HOSPADM

## 2025-09-02 RX ORDER — DIPHENHYDRAMINE HCL 25 MG
50 CAPSULE ORAL
Status: DISCONTINUED | OUTPATIENT
Start: 2025-09-02 | End: 2025-09-02 | Stop reason: HOSPADM

## 2025-09-02 RX ORDER — ONDANSETRON HYDROCHLORIDE 2 MG/ML
4 INJECTION, SOLUTION INTRAVENOUS EVERY 8 HOURS PRN
Status: DISCONTINUED | OUTPATIENT
Start: 2025-09-02 | End: 2025-09-02 | Stop reason: HOSPADM

## 2025-09-02 RX ORDER — HEPARIN SODIUM 1000 [USP'U]/ML
INJECTION, SOLUTION INTRAVENOUS; SUBCUTANEOUS
Status: DISCONTINUED | OUTPATIENT
Start: 2025-09-02 | End: 2025-09-02 | Stop reason: HOSPADM

## 2025-09-02 RX ORDER — MIDAZOLAM HYDROCHLORIDE 1 MG/ML
INJECTION INTRAMUSCULAR; INTRAVENOUS
Status: DISCONTINUED | OUTPATIENT
Start: 2025-09-02 | End: 2025-09-02 | Stop reason: HOSPADM

## 2025-09-02 RX ORDER — HYDROCODONE BITARTRATE AND ACETAMINOPHEN 5; 325 MG/1; MG/1
1 TABLET ORAL EVERY 4 HOURS PRN
Refills: 0 | Status: DISCONTINUED | OUTPATIENT
Start: 2025-09-02 | End: 2025-09-02 | Stop reason: HOSPADM

## 2025-09-02 RX ORDER — DIAZEPAM 5 MG/1
10 TABLET ORAL
Status: DISCONTINUED | OUTPATIENT
Start: 2025-09-02 | End: 2025-09-02 | Stop reason: HOSPADM

## 2025-09-02 RX ORDER — SACUBITRIL AND VALSARTAN 24; 26 MG/1; MG/1
1 TABLET ORAL 2 TIMES DAILY
COMMUNITY

## 2025-09-02 RX ORDER — HYDRALAZINE HYDROCHLORIDE 20 MG/ML
10 INJECTION INTRAMUSCULAR; INTRAVENOUS EVERY 4 HOURS PRN
Status: DISCONTINUED | OUTPATIENT
Start: 2025-09-02 | End: 2025-09-02 | Stop reason: HOSPADM

## 2025-09-02 RX ADMIN — DIAZEPAM 10 MG: 5 TABLET ORAL at 09:09

## 2025-09-02 RX ADMIN — ASPIRIN 325 MG ORAL TABLET 325 MG: 325 PILL ORAL at 09:09

## 2025-09-02 RX ADMIN — SODIUM CHLORIDE: 9 INJECTION, SOLUTION INTRAVENOUS at 07:09

## 2025-09-02 RX ADMIN — DIPHENHYDRAMINE HYDROCHLORIDE 50 MG: 25 CAPSULE ORAL at 09:09

## (undated) DEVICE — Device

## (undated) DEVICE — ANGIOTOUCH KIT

## (undated) DEVICE — CATH OPTITORQUE TIGER 5F 100CM

## (undated) DEVICE — GUIDEWIRE INQWIRE SE 3MM JTIP

## (undated) DEVICE — ARMBOARD ADULT ARTERIAL

## (undated) DEVICE — SHEATH GLIDE SLENDER 6FR

## (undated) DEVICE — BAND TR COMP DEVICE REG 24CM

## (undated) DEVICE — PAD DEFIB CADENCE ADULT R2

## (undated) DEVICE — CATH IMPULSE 5FR PIGTAIL 125CM

## (undated) DEVICE — KIT MANIFOLD LOW PRESS TUBING

## (undated) DEVICE — TOWEL OR DISP STRL BLUE 4/PK